# Patient Record
Sex: FEMALE | Race: WHITE | NOT HISPANIC OR LATINO | URBAN - METROPOLITAN AREA
[De-identification: names, ages, dates, MRNs, and addresses within clinical notes are randomized per-mention and may not be internally consistent; named-entity substitution may affect disease eponyms.]

---

## 2021-02-23 ENCOUNTER — EMERGENCY (EMERGENCY)
Facility: HOSPITAL | Age: 26
LOS: 1 days | Discharge: HOME | End: 2021-02-23
Attending: EMERGENCY MEDICINE | Admitting: EMERGENCY MEDICINE
Payer: COMMERCIAL

## 2021-02-23 VITALS
DIASTOLIC BLOOD PRESSURE: 78 MMHG | TEMPERATURE: 98 F | HEART RATE: 77 BPM | OXYGEN SATURATION: 97 % | SYSTOLIC BLOOD PRESSURE: 117 MMHG | RESPIRATION RATE: 18 BRPM

## 2021-02-23 DIAGNOSIS — Z20.822 CONTACT WITH AND (SUSPECTED) EXPOSURE TO COVID-19: ICD-10-CM

## 2021-02-23 PROCEDURE — 99282 EMERGENCY DEPT VISIT SF MDM: CPT

## 2021-02-23 NOTE — ED PROVIDER NOTE - OBJECTIVE STATEMENT
26 y/o F presents to the ED requesting to have testing done for COVID-19. Pt endorses she is asymptomatic at this time. Pt denies fevers, chills, coughs, CP, and SOB.

## 2021-02-23 NOTE — ED PROVIDER NOTE - PATIENT PORTAL LINK FT
You can access the FollowMyHealth Patient Portal offered by [facility] by registering at the following website: [email or web address]. By joining [employer]’s FollowMyHealth portal, you will also be able to view your health information using other applications (apps) compatible with our system.

## 2021-02-24 LAB — SARS-COV-2 RNA SPEC QL NAA+PROBE: SIGNIFICANT CHANGE UP

## 2023-09-17 ENCOUNTER — EMERGENCY (EMERGENCY)
Facility: HOSPITAL | Age: 28
LOS: 1 days | Discharge: HOME | End: 2023-09-17
Admitting: EMERGENCY MEDICINE
Payer: COMMERCIAL

## 2023-09-17 VITALS
TEMPERATURE: 98 F | WEIGHT: 138.89 LBS | OXYGEN SATURATION: 98 % | RESPIRATION RATE: 18 BRPM | HEART RATE: 50 BPM | DIASTOLIC BLOOD PRESSURE: 79 MMHG | SYSTOLIC BLOOD PRESSURE: 118 MMHG

## 2023-09-17 VITALS
DIASTOLIC BLOOD PRESSURE: 71 MMHG | TEMPERATURE: 98 F | RESPIRATION RATE: 17 BRPM | OXYGEN SATURATION: 99 % | SYSTOLIC BLOOD PRESSURE: 116 MMHG | HEART RATE: 67 BPM

## 2023-09-17 LAB
ALBUMIN SERPL ELPH-MCNC: 4.1 G/DL — SIGNIFICANT CHANGE UP (ref 3.4–5)
ALP SERPL-CCNC: 49 U/L — SIGNIFICANT CHANGE UP (ref 40–120)
ALT FLD-CCNC: 21 U/L — SIGNIFICANT CHANGE UP (ref 12–42)
ANION GAP SERPL CALC-SCNC: 6 MMOL/L — LOW (ref 9–16)
APPEARANCE UR: ABNORMAL
AST SERPL-CCNC: 17 U/L — SIGNIFICANT CHANGE UP (ref 15–37)
BILIRUB SERPL-MCNC: 0.5 MG/DL — SIGNIFICANT CHANGE UP (ref 0.2–1.2)
BILIRUB UR-MCNC: NEGATIVE — SIGNIFICANT CHANGE UP
BUN SERPL-MCNC: 11 MG/DL — SIGNIFICANT CHANGE UP (ref 7–23)
CALCIUM SERPL-MCNC: 9 MG/DL — SIGNIFICANT CHANGE UP (ref 8.5–10.5)
CHLORIDE SERPL-SCNC: 101 MMOL/L — SIGNIFICANT CHANGE UP (ref 96–108)
CO2 SERPL-SCNC: 28 MMOL/L — SIGNIFICANT CHANGE UP (ref 22–31)
COLOR SPEC: YELLOW — SIGNIFICANT CHANGE UP
COMMENT - URINE: SIGNIFICANT CHANGE UP
CREAT SERPL-MCNC: 0.79 MG/DL — SIGNIFICANT CHANGE UP (ref 0.5–1.3)
DIFF PNL FLD: NEGATIVE — SIGNIFICANT CHANGE UP
EGFR: 106 ML/MIN/1.73M2 — SIGNIFICANT CHANGE UP
GLUCOSE SERPL-MCNC: 120 MG/DL — HIGH (ref 70–99)
GLUCOSE UR QL: NEGATIVE MG/DL — SIGNIFICANT CHANGE UP
HCG UR QL: NEGATIVE — SIGNIFICANT CHANGE UP
HCT VFR BLD CALC: 41.5 % — SIGNIFICANT CHANGE UP (ref 34.5–45)
HGB BLD-MCNC: 14 G/DL — SIGNIFICANT CHANGE UP (ref 11.5–15.5)
KETONES UR-MCNC: 40 MG/DL
LACTATE BLDV-MCNC: 1 MMOL/L — SIGNIFICANT CHANGE UP (ref 0.5–2)
LEUKOCYTE ESTERASE UR-ACNC: ABNORMAL
LIDOCAIN IGE QN: 43 U/L — SIGNIFICANT CHANGE UP (ref 16–77)
MAGNESIUM SERPL-MCNC: 1.9 MG/DL — SIGNIFICANT CHANGE UP (ref 1.6–2.6)
MCHC RBC-ENTMCNC: 31.2 PG — SIGNIFICANT CHANGE UP (ref 27–34)
MCHC RBC-ENTMCNC: 33.7 GM/DL — SIGNIFICANT CHANGE UP (ref 32–36)
MCV RBC AUTO: 92.4 FL — SIGNIFICANT CHANGE UP (ref 80–100)
NITRITE UR-MCNC: NEGATIVE — SIGNIFICANT CHANGE UP
NRBC # BLD: 0 /100 WBCS — SIGNIFICANT CHANGE UP (ref 0–0)
PH UR: 8.5 (ref 5–8)
PHOSPHATE SERPL-MCNC: 3.8 MG/DL — SIGNIFICANT CHANGE UP (ref 2.5–4.5)
PLATELET # BLD AUTO: 221 K/UL — SIGNIFICANT CHANGE UP (ref 150–400)
POTASSIUM SERPL-MCNC: 4.1 MMOL/L — SIGNIFICANT CHANGE UP (ref 3.5–5.3)
POTASSIUM SERPL-SCNC: 4.1 MMOL/L — SIGNIFICANT CHANGE UP (ref 3.5–5.3)
PROT SERPL-MCNC: 7.5 G/DL — SIGNIFICANT CHANGE UP (ref 6.4–8.2)
PROT UR-MCNC: NEGATIVE MG/DL — SIGNIFICANT CHANGE UP
RBC # BLD: 4.49 M/UL — SIGNIFICANT CHANGE UP (ref 3.8–5.2)
RBC # FLD: 11.9 % — SIGNIFICANT CHANGE UP (ref 10.3–14.5)
RBC CASTS # UR COMP ASSIST: 0 /HPF — SIGNIFICANT CHANGE UP (ref 0–4)
SODIUM SERPL-SCNC: 135 MMOL/L — SIGNIFICANT CHANGE UP (ref 132–145)
SP GR SPEC: 1.02 — SIGNIFICANT CHANGE UP (ref 1–1.03)
UROBILINOGEN FLD QL: 1 MG/DL — SIGNIFICANT CHANGE UP (ref 0.2–1)
WBC # BLD: 7.97 K/UL — SIGNIFICANT CHANGE UP (ref 3.8–10.5)
WBC # FLD AUTO: 7.97 K/UL — SIGNIFICANT CHANGE UP (ref 3.8–10.5)
WBC UR QL: 0 /HPF — SIGNIFICANT CHANGE UP (ref 0–5)

## 2023-09-17 PROCEDURE — 76830 TRANSVAGINAL US NON-OB: CPT | Mod: 26

## 2023-09-17 PROCEDURE — 99284 EMERGENCY DEPT VISIT MOD MDM: CPT

## 2023-09-17 PROCEDURE — 76705 ECHO EXAM OF ABDOMEN: CPT | Mod: 26

## 2023-09-17 PROCEDURE — 76856 US EXAM PELVIC COMPLETE: CPT | Mod: 26,59

## 2023-09-17 RX ORDER — ACETAMINOPHEN 500 MG
650 TABLET ORAL ONCE
Refills: 0 | Status: COMPLETED | OUTPATIENT
Start: 2023-09-17 | End: 2023-09-17

## 2023-09-17 RX ORDER — SODIUM CHLORIDE 9 MG/ML
1000 INJECTION INTRAMUSCULAR; INTRAVENOUS; SUBCUTANEOUS ONCE
Refills: 0 | Status: COMPLETED | OUTPATIENT
Start: 2023-09-17 | End: 2023-09-17

## 2023-09-17 RX ORDER — FAMOTIDINE 10 MG/ML
20 INJECTION INTRAVENOUS ONCE
Refills: 0 | Status: DISCONTINUED | OUTPATIENT
Start: 2023-09-17 | End: 2023-09-17

## 2023-09-17 RX ORDER — ONDANSETRON 8 MG/1
4 TABLET, FILM COATED ORAL ONCE
Refills: 0 | Status: COMPLETED | OUTPATIENT
Start: 2023-09-17 | End: 2023-09-17

## 2023-09-17 RX ADMIN — ONDANSETRON 4 MILLIGRAM(S): 8 TABLET, FILM COATED ORAL at 15:29

## 2023-09-17 RX ADMIN — SODIUM CHLORIDE 1000 MILLILITER(S): 9 INJECTION INTRAMUSCULAR; INTRAVENOUS; SUBCUTANEOUS at 15:30

## 2023-09-17 NOTE — ED PROVIDER NOTE - OBJECTIVE STATEMENT
28-year-old female, no medical history, presents this emergency department for upper abdominal pain that started upon awakening this morning with nausea and vomiting.  Patient states that 10 days ago she was urinating when she smelled something funny.  States that she proceeded to get a tampon that was in the 14 days.  Patient then went to her GYN, urine was done which grew nothing and was tested for BV, yeast, and STDs which were all negative.  However patient was still placed on Flagyl.  States that she is on day 3 of her Flagyl now.  Denies any blood or bilious vomiting.  Last bowel movement was this morning.  Denies any surgical history.

## 2023-09-17 NOTE — ED PROVIDER NOTE - CLINICAL SUMMARY MEDICAL DECISION MAKING FREE TEXT BOX
Cannot rule out gallbladder vs pancreas abnormalities vs GERD   Cannot rule out appendicitis vs diverticulitis vs nephrolithiasis vs ovarian/testicular abnormalities   Denies hypotension, pulsatile mass, VSS, no sudden/severe pain - AAA unlikely  Denies hematuria, N/V, pt is well-appearing - Nephrolithiasis unlikely  Labs, lipase UA, UCG ordered  Fluids and pain relief ordered  Abd and pelvic U/S ordered  Will continue to monitor pt

## 2023-09-17 NOTE — ED ADULT NURSE NOTE - NSFALLUNIVINTERV_ED_ALL_ED
Bed/Stretcher in lowest position, wheels locked, appropriate side rails in place/Call bell, personal items and telephone in reach/Instruct patient to call for assistance before getting out of bed/chair/stretcher/Non-slip footwear applied when patient is off stretcher/[place] to call system/Physically safe environment - no spills, clutter or unnecessary equipment/Purposeful proactive rounding/Room/bathroom lighting operational, light cord in reach

## 2023-09-17 NOTE — ED PROVIDER NOTE - PATIENT PORTAL LINK FT
You can access the FollowMyHealth Patient Portal offered by [facility] by registering at the following website: [email or web address]. By joining [employer]’s FollowMyHealth portal, you will also be able to view your health information using other applications (apps) compatible with our system.

## 2023-09-17 NOTE — ED PROVIDER NOTE - NSFOLLOWUPINSTRUCTIONS_ED_ALL_ED_FT
Overview  A functional ovarian cyst is a fluid-filled sac that forms on the ovary. A sac normally forms during ovulation to hold a maturing egg. Usually the sac goes away after the egg is released. But if the egg is not released, or if the sac closes up after the egg is released, the sac can swell up with fluid. This forms a functional cyst.    These ovarian cysts are different than ovarian growths caused by other problems, such as cancer. Most functional ovarian cysts cause no symptoms and go away on their own. Some cause mild pain. Others can cause severe pain when they break or bleed.    Follow-up care is a key part of your treatment and safety. Be sure to make and go to all appointments, and call your doctor or nurse advice line ([phone] in most provinces and territories) if you are having problems. It's also a good idea to know your test results and keep a list of the medicines you take.    How can you care for yourself at home?  Use heat, such as a hot water bottle, a heating pad set on low, or a warm bath, to relax tense muscles and relieve cramping.  Be safe with medicines. Take pain medicines exactly as directed.  If the doctor gave you a prescription medicine for pain, take it as prescribed.  If you are not taking a prescription pain medicine, ask your doctor if you can take an over-the-counter medicine.  Avoid constipation. Make sure you drink enough fluids and include fruits, vegetables, and fibre in your diet each day. Constipation does not cause ovarian cysts, but it may make you feel more uncomfortable.  When should you call for help?  	  Call your doctor or nurse advice line now or seek immediate medical care if:    You have severe vaginal bleeding.  You have new or worse belly or pelvic pain.  Watch closely for changes in your health, and be sure to contact your doctor or nurse advice line if:    You have unusual vaginal bleeding.  You do not get better as expected.

## 2023-09-19 ENCOUNTER — TRANSCRIPTION ENCOUNTER (OUTPATIENT)
Age: 28
End: 2023-09-19

## 2023-09-19 ENCOUNTER — INPATIENT (INPATIENT)
Facility: HOSPITAL | Age: 28
LOS: 12 days | Discharge: HOME HEALTH | DRG: 853 | End: 2023-10-02
Attending: SURGERY | Admitting: SURGERY
Payer: COMMERCIAL

## 2023-09-19 VITALS
OXYGEN SATURATION: 98 % | WEIGHT: 138.01 LBS | HEIGHT: 69 IN | RESPIRATION RATE: 16 BRPM | TEMPERATURE: 99 F | HEART RATE: 73 BPM | DIASTOLIC BLOOD PRESSURE: 75 MMHG | SYSTOLIC BLOOD PRESSURE: 117 MMHG

## 2023-09-19 LAB
ALBUMIN SERPL ELPH-MCNC: 3.4 G/DL — SIGNIFICANT CHANGE UP (ref 3.4–5)
ALP SERPL-CCNC: 51 U/L — SIGNIFICANT CHANGE UP (ref 40–120)
ALT FLD-CCNC: 16 U/L — SIGNIFICANT CHANGE UP (ref 12–42)
ANION GAP SERPL CALC-SCNC: 8 MMOL/L — LOW (ref 9–16)
APTT BLD: 26.6 SEC — SIGNIFICANT CHANGE UP (ref 24.5–35.6)
AST SERPL-CCNC: 13 U/L — LOW (ref 15–37)
BASOPHILS # BLD AUTO: 0.04 K/UL — SIGNIFICANT CHANGE UP (ref 0–0.2)
BASOPHILS NFR BLD AUTO: 0.3 % — SIGNIFICANT CHANGE UP (ref 0–2)
BILIRUB SERPL-MCNC: 0.7 MG/DL — SIGNIFICANT CHANGE UP (ref 0.2–1.2)
BUN SERPL-MCNC: 10 MG/DL — SIGNIFICANT CHANGE UP (ref 7–23)
CALCIUM SERPL-MCNC: 8.7 MG/DL — SIGNIFICANT CHANGE UP (ref 8.5–10.5)
CHLORIDE SERPL-SCNC: 105 MMOL/L — SIGNIFICANT CHANGE UP (ref 96–108)
CO2 SERPL-SCNC: 27 MMOL/L — SIGNIFICANT CHANGE UP (ref 22–31)
CREAT SERPL-MCNC: 0.73 MG/DL — SIGNIFICANT CHANGE UP (ref 0.5–1.3)
EGFR: 116 ML/MIN/1.73M2 — SIGNIFICANT CHANGE UP
EOSINOPHIL # BLD AUTO: 0.02 K/UL — SIGNIFICANT CHANGE UP (ref 0–0.5)
EOSINOPHIL NFR BLD AUTO: 0.1 % — SIGNIFICANT CHANGE UP (ref 0–6)
GLUCOSE SERPL-MCNC: 116 MG/DL — HIGH (ref 70–99)
HCG SERPL-ACNC: <1 MIU/ML — SIGNIFICANT CHANGE UP
HCT VFR BLD CALC: 43.1 % — SIGNIFICANT CHANGE UP (ref 34.5–45)
HGB BLD-MCNC: 14.7 G/DL — SIGNIFICANT CHANGE UP (ref 11.5–15.5)
IMM GRANULOCYTES NFR BLD AUTO: 0.7 % — SIGNIFICANT CHANGE UP (ref 0–0.9)
INR BLD: 1.09 — SIGNIFICANT CHANGE UP (ref 0.85–1.18)
LIDOCAIN IGE QN: 26 U/L — SIGNIFICANT CHANGE UP (ref 16–77)
LYMPHOCYTES # BLD AUTO: 0.93 K/UL — LOW (ref 1–3.3)
LYMPHOCYTES # BLD AUTO: 6.3 % — LOW (ref 13–44)
MCHC RBC-ENTMCNC: 31.6 PG — SIGNIFICANT CHANGE UP (ref 27–34)
MCHC RBC-ENTMCNC: 34.1 GM/DL — SIGNIFICANT CHANGE UP (ref 32–36)
MCV RBC AUTO: 92.7 FL — SIGNIFICANT CHANGE UP (ref 80–100)
MONOCYTES # BLD AUTO: 1.15 K/UL — HIGH (ref 0–0.9)
MONOCYTES NFR BLD AUTO: 7.7 % — SIGNIFICANT CHANGE UP (ref 2–14)
NEUTROPHILS # BLD AUTO: 12.64 K/UL — HIGH (ref 1.8–7.4)
NEUTROPHILS NFR BLD AUTO: 84.9 % — HIGH (ref 43–77)
NRBC # BLD: 0 /100 WBCS — SIGNIFICANT CHANGE UP (ref 0–0)
PLATELET # BLD AUTO: 247 K/UL — SIGNIFICANT CHANGE UP (ref 150–400)
POTASSIUM SERPL-MCNC: 4.3 MMOL/L — SIGNIFICANT CHANGE UP (ref 3.5–5.3)
POTASSIUM SERPL-SCNC: 4.3 MMOL/L — SIGNIFICANT CHANGE UP (ref 3.5–5.3)
PROT SERPL-MCNC: 6.8 G/DL — SIGNIFICANT CHANGE UP (ref 6.4–8.2)
PROTHROM AB SERPL-ACNC: 12.3 SEC — SIGNIFICANT CHANGE UP (ref 9.5–13)
RBC # BLD: 4.65 M/UL — SIGNIFICANT CHANGE UP (ref 3.8–5.2)
RBC # FLD: 12.2 % — SIGNIFICANT CHANGE UP (ref 10.3–14.5)
SODIUM SERPL-SCNC: 140 MMOL/L — SIGNIFICANT CHANGE UP (ref 132–145)
WBC # BLD: 14.88 K/UL — HIGH (ref 3.8–10.5)
WBC # FLD AUTO: 14.88 K/UL — HIGH (ref 3.8–10.5)

## 2023-09-19 PROCEDURE — 74177 CT ABD & PELVIS W/CONTRAST: CPT | Mod: 26

## 2023-09-19 PROCEDURE — 99285 EMERGENCY DEPT VISIT HI MDM: CPT

## 2023-09-19 RX ORDER — HYDROMORPHONE HYDROCHLORIDE 2 MG/ML
0.5 INJECTION INTRAMUSCULAR; INTRAVENOUS; SUBCUTANEOUS ONCE
Refills: 0 | Status: DISCONTINUED | OUTPATIENT
Start: 2023-09-19 | End: 2023-09-19

## 2023-09-19 RX ORDER — METRONIDAZOLE 500 MG
500 TABLET ORAL ONCE
Refills: 0 | Status: COMPLETED | OUTPATIENT
Start: 2023-09-19 | End: 2023-09-19

## 2023-09-19 RX ORDER — CEFTRIAXONE 500 MG/1
1000 INJECTION, POWDER, FOR SOLUTION INTRAMUSCULAR; INTRAVENOUS ONCE
Refills: 0 | Status: COMPLETED | OUTPATIENT
Start: 2023-09-19 | End: 2023-09-19

## 2023-09-19 RX ORDER — MORPHINE SULFATE 50 MG/1
4 CAPSULE, EXTENDED RELEASE ORAL ONCE
Refills: 0 | Status: DISCONTINUED | OUTPATIENT
Start: 2023-09-19 | End: 2023-09-19

## 2023-09-19 RX ORDER — SODIUM CHLORIDE 9 MG/ML
1000 INJECTION INTRAMUSCULAR; INTRAVENOUS; SUBCUTANEOUS ONCE
Refills: 0 | Status: COMPLETED | OUTPATIENT
Start: 2023-09-19 | End: 2023-09-19

## 2023-09-19 RX ORDER — ONDANSETRON 8 MG/1
4 TABLET, FILM COATED ORAL ONCE
Refills: 0 | Status: COMPLETED | OUTPATIENT
Start: 2023-09-19 | End: 2023-09-19

## 2023-09-19 RX ORDER — HYDROMORPHONE HYDROCHLORIDE 2 MG/ML
1 INJECTION INTRAMUSCULAR; INTRAVENOUS; SUBCUTANEOUS ONCE
Refills: 0 | Status: DISCONTINUED | OUTPATIENT
Start: 2023-09-19 | End: 2023-09-19

## 2023-09-19 RX ADMIN — MORPHINE SULFATE 4 MILLIGRAM(S): 50 CAPSULE, EXTENDED RELEASE ORAL at 19:29

## 2023-09-19 RX ADMIN — SODIUM CHLORIDE 1000 MILLILITER(S): 9 INJECTION INTRAMUSCULAR; INTRAVENOUS; SUBCUTANEOUS at 19:29

## 2023-09-19 RX ADMIN — CEFTRIAXONE 100 MILLIGRAM(S): 500 INJECTION, POWDER, FOR SOLUTION INTRAMUSCULAR; INTRAVENOUS at 20:32

## 2023-09-19 RX ADMIN — Medication 100 MILLIGRAM(S): at 20:45

## 2023-09-19 RX ADMIN — MORPHINE SULFATE 4 MILLIGRAM(S): 50 CAPSULE, EXTENDED RELEASE ORAL at 23:18

## 2023-09-19 RX ADMIN — HYDROMORPHONE HYDROCHLORIDE 1 MILLIGRAM(S): 2 INJECTION INTRAMUSCULAR; INTRAVENOUS; SUBCUTANEOUS at 20:34

## 2023-09-19 RX ADMIN — ONDANSETRON 4 MILLIGRAM(S): 8 TABLET, FILM COATED ORAL at 19:29

## 2023-09-19 NOTE — ED PROVIDER NOTE - ATTENDING APP SHARED VISIT CONTRIBUTION OF CARE
I have seen the pt, reviewed all pertinent clinical data, and I agree with the documentation/care/plan executed by RODY Monsalve.

## 2023-09-19 NOTE — ED PROVIDER NOTE - OBJECTIVE STATEMENT
29 yo female seen 2 days ago for abdominal pain presents today for worsening abdominal pain mostly to right side of abdomen with assoc nausea/vomiting. no fever or diarrhea. no urinary symptoms. labs/US reviewed from 2 days ago -- 2cm right ovarian cyst with small amount of free fluid.

## 2023-09-19 NOTE — ED PROVIDER NOTE - NS ED ATTENDING STATEMENT MOD
This was a shared visit with the GORDY. I reviewed and verified the documentation and independently performed the documented:

## 2023-09-19 NOTE — ED PROVIDER NOTE - CLINICAL SUMMARY MEDICAL DECISION MAKING FREE TEXT BOX
+ peritoneal abdomen, + ruptured appendicitis w/appendicolith on CT, VSS/hemodynamically stable. Receiving IVF, pain control, IV abx in the ED, accepted to surgical service at [facility] for definitive management to regional bed.

## 2023-09-19 NOTE — ED ADULT NURSE NOTE - CHIEF COMPLAINT QUOTE
abdo pain seen here recently dx ovarian cyst right side, pain tonight worse, took 4 advil with tyelenol 40 mins, vocera pain upgrade, 1904, pt changed to cat 2 as not yet seen a provider and in pain

## 2023-09-19 NOTE — ED PROVIDER NOTE - PHYSICAL EXAMINATION
CONSTITUTIONAL: Well-appearing; well-nourished; in no apparent distress.   	HEAD: Normocephalic; atraumatic.   	EYES: clear  	ENT: normal nose; no rhinorrhea; normal pharynx with no erythema or lesions.   	NECK: Supple; non-tender;   	CARDIOVASCULAR: Normal S1, S2; no murmurs, rubs, or gallops. Regular rate and rhythm.   	RESPIRATORY: Breathing easily; breath sounds clear and equal bilaterally; no wheezes, rhonchi, or rales.  	GI: Soft; non-distended; +right lower abdominal tenderness to palpation with voluntary guarding. no rebound. no cvat bilaterally.   	EXT: CANTU x 4. normal gait.   	SKIN: Normal for age and race; warm; dry; good turgor; no apparent lesions or rash.   	NEURO: A & O x 3; face symmetric; grossly unremarkable.   PSYCHOLOGICAL: The patient’s mood and manner are appropriate.

## 2023-09-19 NOTE — ED ADULT TRIAGE NOTE - CHIEF COMPLAINT QUOTE
abdo pain seen here recently dx ovarian cyst right side, pain tonight worse, took 4 advil with tyelenol 40 mins abdo pain seen here recently dx ovarian cyst right side, pain tonight worse, took 4 advil with tyelenol 40 mins, vocera pain upgrade abdo pain seen here recently dx ovarian cyst right side, pain tonight worse, took 4 advil with tyelenol 40 mins, vocera pain upgrade, 1904, pt changed to cat 2 as not yet seen a provider and in pain

## 2023-09-20 ENCOUNTER — TRANSCRIPTION ENCOUNTER (OUTPATIENT)
Age: 28
End: 2023-09-20

## 2023-09-20 DIAGNOSIS — Z98.890 OTHER SPECIFIED POSTPROCEDURAL STATES: Chronic | ICD-10-CM

## 2023-09-20 LAB
ANION GAP SERPL CALC-SCNC: 10 MMOL/L — SIGNIFICANT CHANGE UP (ref 5–17)
BLD GP AB SCN SERPL QL: NEGATIVE — SIGNIFICANT CHANGE UP
BLD GP AB SCN SERPL QL: NEGATIVE — SIGNIFICANT CHANGE UP
BUN SERPL-MCNC: 6 MG/DL — LOW (ref 7–23)
CALCIUM SERPL-MCNC: 8.2 MG/DL — LOW (ref 8.4–10.5)
CHLORIDE SERPL-SCNC: 106 MMOL/L — SIGNIFICANT CHANGE UP (ref 96–108)
CO2 SERPL-SCNC: 24 MMOL/L — SIGNIFICANT CHANGE UP (ref 22–31)
CREAT SERPL-MCNC: 0.81 MG/DL — SIGNIFICANT CHANGE UP (ref 0.5–1.3)
EGFR: 103 ML/MIN/1.73M2 — SIGNIFICANT CHANGE UP
GLUCOSE SERPL-MCNC: 111 MG/DL — HIGH (ref 70–99)
GRAM STN FLD: SIGNIFICANT CHANGE UP
HCT VFR BLD CALC: 38.7 % — SIGNIFICANT CHANGE UP (ref 34.5–45)
HGB BLD-MCNC: 13 G/DL — SIGNIFICANT CHANGE UP (ref 11.5–15.5)
MAGNESIUM SERPL-MCNC: 1.4 MG/DL — LOW (ref 1.6–2.6)
MCHC RBC-ENTMCNC: 31 PG — SIGNIFICANT CHANGE UP (ref 27–34)
MCHC RBC-ENTMCNC: 33.6 GM/DL — SIGNIFICANT CHANGE UP (ref 32–36)
MCV RBC AUTO: 92.4 FL — SIGNIFICANT CHANGE UP (ref 80–100)
NRBC # BLD: 0 /100 WBCS — SIGNIFICANT CHANGE UP (ref 0–0)
PHOSPHATE SERPL-MCNC: 4.2 MG/DL — SIGNIFICANT CHANGE UP (ref 2.5–4.5)
PLATELET # BLD AUTO: 233 K/UL — SIGNIFICANT CHANGE UP (ref 150–400)
POTASSIUM SERPL-MCNC: 3.9 MMOL/L — SIGNIFICANT CHANGE UP (ref 3.5–5.3)
POTASSIUM SERPL-SCNC: 3.9 MMOL/L — SIGNIFICANT CHANGE UP (ref 3.5–5.3)
RBC # BLD: 4.19 M/UL — SIGNIFICANT CHANGE UP (ref 3.8–5.2)
RBC # FLD: 12.3 % — SIGNIFICANT CHANGE UP (ref 10.3–14.5)
RH IG SCN BLD-IMP: POSITIVE — SIGNIFICANT CHANGE UP
RH IG SCN BLD-IMP: POSITIVE — SIGNIFICANT CHANGE UP
SODIUM SERPL-SCNC: 140 MMOL/L — SIGNIFICANT CHANGE UP (ref 135–145)
SPECIMEN SOURCE: SIGNIFICANT CHANGE UP
WBC # BLD: 15.53 K/UL — HIGH (ref 3.8–10.5)
WBC # FLD AUTO: 15.53 K/UL — HIGH (ref 3.8–10.5)

## 2023-09-20 PROCEDURE — 71045 X-RAY EXAM CHEST 1 VIEW: CPT | Mod: 26

## 2023-09-20 PROCEDURE — 74018 RADEX ABDOMEN 1 VIEW: CPT | Mod: 26

## 2023-09-20 PROCEDURE — 99253 IP/OBS CNSLTJ NEW/EST LOW 45: CPT

## 2023-09-20 PROCEDURE — 88304 TISSUE EXAM BY PATHOLOGIST: CPT | Mod: 26

## 2023-09-20 PROCEDURE — 99231 SBSQ HOSP IP/OBS SF/LOW 25: CPT

## 2023-09-20 DEVICE — STAPLER COVIDIEN TRI-STAPLE 45MM PURPLE RELOAD: Type: IMPLANTABLE DEVICE | Status: FUNCTIONAL

## 2023-09-20 RX ORDER — MAGNESIUM SULFATE 500 MG/ML
2 VIAL (ML) INJECTION EVERY 6 HOURS
Refills: 0 | Status: DISCONTINUED | OUTPATIENT
Start: 2023-09-20 | End: 2023-09-23

## 2023-09-20 RX ORDER — HYDROMORPHONE HYDROCHLORIDE 2 MG/ML
0.25 INJECTION INTRAMUSCULAR; INTRAVENOUS; SUBCUTANEOUS
Refills: 0 | Status: DISCONTINUED | OUTPATIENT
Start: 2023-09-20 | End: 2023-09-22

## 2023-09-20 RX ORDER — HYDROMORPHONE HYDROCHLORIDE 2 MG/ML
0.25 INJECTION INTRAMUSCULAR; INTRAVENOUS; SUBCUTANEOUS EVERY 6 HOURS
Refills: 0 | Status: DISCONTINUED | OUTPATIENT
Start: 2023-09-20 | End: 2023-09-20

## 2023-09-20 RX ORDER — INFLUENZA VIRUS VACCINE 15; 15; 15; 15 UG/.5ML; UG/.5ML; UG/.5ML; UG/.5ML
0.5 SUSPENSION INTRAMUSCULAR ONCE
Refills: 0 | Status: DISCONTINUED | OUTPATIENT
Start: 2023-09-20 | End: 2023-10-02

## 2023-09-20 RX ORDER — PANTOPRAZOLE SODIUM 20 MG/1
40 TABLET, DELAYED RELEASE ORAL DAILY
Refills: 0 | Status: DISCONTINUED | OUTPATIENT
Start: 2023-09-20 | End: 2023-09-22

## 2023-09-20 RX ORDER — METRONIDAZOLE 500 MG
500 TABLET ORAL EVERY 8 HOURS
Refills: 0 | Status: DISCONTINUED | OUTPATIENT
Start: 2023-09-20 | End: 2023-09-22

## 2023-09-20 RX ORDER — CEFTRIAXONE 500 MG/1
1000 INJECTION, POWDER, FOR SOLUTION INTRAMUSCULAR; INTRAVENOUS EVERY 24 HOURS
Refills: 0 | Status: DISCONTINUED | OUTPATIENT
Start: 2023-09-20 | End: 2023-09-22

## 2023-09-20 RX ORDER — OXYCODONE HYDROCHLORIDE 5 MG/1
5 TABLET ORAL EVERY 6 HOURS
Refills: 0 | Status: DISCONTINUED | OUTPATIENT
Start: 2023-09-20 | End: 2023-09-20

## 2023-09-20 RX ORDER — HYDROMORPHONE HYDROCHLORIDE 2 MG/ML
0.25 INJECTION INTRAMUSCULAR; INTRAVENOUS; SUBCUTANEOUS EVERY 4 HOURS
Refills: 0 | Status: DISCONTINUED | OUTPATIENT
Start: 2023-09-20 | End: 2023-09-27

## 2023-09-20 RX ORDER — METRONIDAZOLE 500 MG
500 TABLET ORAL EVERY 8 HOURS
Refills: 0 | Status: DISCONTINUED | OUTPATIENT
Start: 2023-09-20 | End: 2023-09-20

## 2023-09-20 RX ORDER — HYDROMORPHONE HYDROCHLORIDE 2 MG/ML
0.5 INJECTION INTRAMUSCULAR; INTRAVENOUS; SUBCUTANEOUS EVERY 6 HOURS
Refills: 0 | Status: DISCONTINUED | OUTPATIENT
Start: 2023-09-20 | End: 2023-09-20

## 2023-09-20 RX ORDER — SODIUM CHLORIDE 9 MG/ML
1000 INJECTION, SOLUTION INTRAVENOUS
Refills: 0 | Status: DISCONTINUED | OUTPATIENT
Start: 2023-09-20 | End: 2023-09-20

## 2023-09-20 RX ORDER — SODIUM CHLORIDE 9 MG/ML
1000 INJECTION, SOLUTION INTRAVENOUS
Refills: 0 | Status: DISCONTINUED | OUTPATIENT
Start: 2023-09-20 | End: 2023-09-22

## 2023-09-20 RX ORDER — KETOROLAC TROMETHAMINE 30 MG/ML
15 SYRINGE (ML) INJECTION EVERY 6 HOURS
Refills: 0 | Status: DISCONTINUED | OUTPATIENT
Start: 2023-09-20 | End: 2023-09-20

## 2023-09-20 RX ORDER — ACETAMINOPHEN 500 MG
1000 TABLET ORAL ONCE
Refills: 0 | Status: COMPLETED | OUTPATIENT
Start: 2023-09-20 | End: 2023-09-20

## 2023-09-20 RX ORDER — ACETAMINOPHEN 500 MG
1000 TABLET ORAL ONCE
Refills: 0 | Status: COMPLETED | OUTPATIENT
Start: 2023-09-21 | End: 2023-09-21

## 2023-09-20 RX ORDER — CEFTRIAXONE 500 MG/1
1000 INJECTION, POWDER, FOR SOLUTION INTRAMUSCULAR; INTRAVENOUS EVERY 24 HOURS
Refills: 0 | Status: CANCELLED | OUTPATIENT
Start: 2023-09-21 | End: 2023-09-20

## 2023-09-20 RX ORDER — HEPARIN SODIUM 5000 [USP'U]/ML
5000 INJECTION INTRAVENOUS; SUBCUTANEOUS EVERY 8 HOURS
Refills: 0 | Status: DISCONTINUED | OUTPATIENT
Start: 2023-09-20 | End: 2023-09-26

## 2023-09-20 RX ORDER — LISDEXAMFETAMINE DIMESYLATE 70 MG/1
1 CAPSULE ORAL
Refills: 0 | DISCHARGE

## 2023-09-20 RX ORDER — BENZOCAINE 10 %
1 GEL (GRAM) MUCOUS MEMBRANE THREE TIMES A DAY
Refills: 0 | Status: DISCONTINUED | OUTPATIENT
Start: 2023-09-20 | End: 2023-10-01

## 2023-09-20 RX ORDER — OXYCODONE HYDROCHLORIDE 5 MG/1
10 TABLET ORAL EVERY 6 HOURS
Refills: 0 | Status: DISCONTINUED | OUTPATIENT
Start: 2023-09-20 | End: 2023-09-20

## 2023-09-20 RX ORDER — HYDROMORPHONE HYDROCHLORIDE 2 MG/ML
0.5 INJECTION INTRAMUSCULAR; INTRAVENOUS; SUBCUTANEOUS EVERY 4 HOURS
Refills: 0 | Status: DISCONTINUED | OUTPATIENT
Start: 2023-09-20 | End: 2023-09-27

## 2023-09-20 RX ORDER — KETOROLAC TROMETHAMINE 30 MG/ML
15 SYRINGE (ML) INJECTION EVERY 6 HOURS
Refills: 0 | Status: DISCONTINUED | OUTPATIENT
Start: 2023-09-20 | End: 2023-09-23

## 2023-09-20 RX ADMIN — HYDROMORPHONE HYDROCHLORIDE 0.5 MILLIGRAM(S): 2 INJECTION INTRAMUSCULAR; INTRAVENOUS; SUBCUTANEOUS at 08:31

## 2023-09-20 RX ADMIN — HYDROMORPHONE HYDROCHLORIDE 0.25 MILLIGRAM(S): 2 INJECTION INTRAMUSCULAR; INTRAVENOUS; SUBCUTANEOUS at 11:58

## 2023-09-20 RX ADMIN — CEFTRIAXONE 100 MILLIGRAM(S): 500 INJECTION, POWDER, FOR SOLUTION INTRAMUSCULAR; INTRAVENOUS at 22:26

## 2023-09-20 RX ADMIN — SODIUM CHLORIDE 100 MILLILITER(S): 9 INJECTION, SOLUTION INTRAVENOUS at 02:07

## 2023-09-20 RX ADMIN — Medication 400 MILLIGRAM(S): at 17:00

## 2023-09-20 RX ADMIN — Medication 1 SPRAY(S): at 17:48

## 2023-09-20 RX ADMIN — Medication 15 MILLIGRAM(S): at 21:07

## 2023-09-20 RX ADMIN — Medication 1 SPRAY(S): at 22:26

## 2023-09-20 RX ADMIN — Medication 100 MILLIGRAM(S): at 22:56

## 2023-09-20 RX ADMIN — Medication 400 MILLIGRAM(S): at 02:07

## 2023-09-20 RX ADMIN — HYDROMORPHONE HYDROCHLORIDE 0.5 MILLIGRAM(S): 2 INJECTION INTRAMUSCULAR; INTRAVENOUS; SUBCUTANEOUS at 02:25

## 2023-09-20 RX ADMIN — Medication 25 GRAM(S): at 07:42

## 2023-09-20 RX ADMIN — Medication 100 MILLIGRAM(S): at 04:57

## 2023-09-20 RX ADMIN — HEPARIN SODIUM 5000 UNIT(S): 5000 INJECTION INTRAVENOUS; SUBCUTANEOUS at 21:08

## 2023-09-20 RX ADMIN — HYDROMORPHONE HYDROCHLORIDE 0.5 MILLIGRAM(S): 2 INJECTION INTRAMUSCULAR; INTRAVENOUS; SUBCUTANEOUS at 03:18

## 2023-09-20 RX ADMIN — Medication 400 MILLIGRAM(S): at 23:59

## 2023-09-20 NOTE — H&P ADULT - NSHPPHYSICALEXAM_GEN_ALL_CORE
T(C): 38.5 (09-20-23 @ 01:51), Max: 38.5 (09-20-23 @ 01:51)  HR: 94 (09-20-23 @ 01:51) (73 - 95)  BP: 116/77 (09-20-23 @ 01:51) (102/67 - 117/75)  RR: 18 (09-20-23 @ 01:51) (16 - 18)  SpO2: 100% (09-20-23 @ 01:51) (98% - 100%)    CONSTITUTIONAL: Well groomed,   EYES: PERRLA and symmetric, EOMI,   ENMT: Oral mucosa with moist membranes.   RESP: No respiratory distress, no use of accessory muscles;   CV: RRR, +S1S2,   GI: firm, diffuse tenderness to palpation more tender along right quadrant, most tender on lower right quadrant   SKIN: No rashes or ulcers noted;  NEURO: CN II-XII intact  PSYCH: Appropriate insight/judgment; A+O x 3, mood and affect appropriate, recent/remote memory intact

## 2023-09-20 NOTE — PRE-ANESTHESIA EVALUATION ADULT - NSANTHOSAYNRD_GEN_A_CORE
No. ANAID screening performed.  STOP BANG Legend: 0-2 = LOW Risk; 3-4 = INTERMEDIATE Risk; 5-8 = HIGH Risk

## 2023-09-20 NOTE — CONSULT NOTE ADULT - SUBJECTIVE AND OBJECTIVE BOX
HPI:  29 yo female who PMH of ADHD, presenting for worsening right sided abdominal pain since Sunday. Patient reports she went to [facility] on sunday because of right sided abdominal pain which was not fully relieved by Advil and Tylenol. After receiving a pelvic US, she was told she had a right ovarian cyst and was discharged home with instruction to manage her pain. Patient notes the pain increased and was worsened with movement, prompting her to come back to the ED. She reports intermittent nausea, with 3 episodes of non bloody emesis since sunday. She notes having decreased appetite. She reports her last BM was today. She notes her last flatus was in the morning. She denies any chest pain, shortness of breath, fevers, or chills.   (20 Sep 2023 03:03)    Patient is a 28y old  Female who presents with a chief complaint of Acute appendicitis (20 Sep 2023 07:27)    SUBJECTIVE:  Patient was seen and examined at bedside. Feeling dizzy, has persistent abdominal pain, no N.V, no SOB, no chest pain. No other complaints or events reported    Review of systems: No fever, chills, dizziness, HA, Changes in vision, CP, dyspnea, nausea or vomiting, dysuria, LE edema. Rest of 12 point Review of systems negative unless otherwise documented elsewhere in note.         MEDICATIONS:  MEDICATIONS  (STANDING):  influenza   Vaccine 0.5 milliLiter(s) IntraMuscular once  magnesium sulfate  IVPB 2 Gram(s) IV Intermittent every 6 hours    MEDICATIONS  (PRN):      Allergies    No Known Allergies    Intolerances        OBJECTIVE:  Vital Signs Last 24 Hrs  T(C): 37.2 (20 Sep 2023 12:03), Max: 38.5 (20 Sep 2023 01:51)  T(F): 98.9 (20 Sep 2023 10:16), Max: 101.3 (20 Sep 2023 01:51)  HR: 94 (20 Sep 2023 12:03) (73 - 95)  BP: 112/77 (20 Sep 2023 12:03) (102/67 - 117/75)  BP(mean): 88 (20 Sep 2023 12:03) (88 - 88)  RR: 17 (20 Sep 2023 12:03) (16 - 18)  SpO2: 99% (20 Sep 2023 12:03) (97% - 100%)    Parameters below as of 20 Sep 2023 09:10  Patient On (Oxygen Delivery Method): room air      I&O's Summary    19 Sep 2023 07:01  -  20 Sep 2023 07:00  --------------------------------------------------------  IN: 800 mL / OUT: 600 mL / NET: 200 mL        PHYSICAL EXAM:  General: AOX3, in mild distress, lying in bed, no labored breathing on RA, speaking in full sentences   HEENT: AT/NC, no facial asymmetry  Lungs: poor inspiration, no crackles, no wheezes  Heart: RRR  Abdomen:+ tenderness  Extremities: warm, no edema, no tenderness, no focal deficit     LABS:                        13.0   15.53 )-----------( 233      ( 20 Sep 2023 05:53 )             38.7     09-20    140  |  106  |  6<L>  ----------------------------<  111<H>  3.9   |  24  |  0.81    Ca    8.2<L>      20 Sep 2023 05:53  Phos  4.2     09-20  Mg     1.4     09-20    TPro  6.8  /  Alb  3.4  /  TBili  0.7  /  DBili  x   /  AST  13<L>  /  ALT  16  /  AlkPhos  51  09-19    LIVER FUNCTIONS - ( 19 Sep 2023 19:25 )  Alb: 3.4 g/dL / Pro: 6.8 g/dL / ALK PHOS: 51 U/L / ALT: 16 U/L / AST: 13 U/L / GGT: x           PT/INR - ( 19 Sep 2023 19:25 )   PT: 12.3 sec;   INR: 1.09          PTT - ( 19 Sep 2023 19:25 )  PTT:26.6 sec  CAPILLARY BLOOD GLUCOSE        Urinalysis Basic - ( 20 Sep 2023 05:53 )    Color: x / Appearance: x / SG: x / pH: x  Gluc: 111 mg/dL / Ketone: x  / Bili: x / Urobili: x   Blood: x / Protein: x / Nitrite: x   Leuk Esterase: x / RBC: x / WBC x   Sq Epi: x / Non Sq Epi: x / Bacteria: x        MICRODATA:      RADIOLOGY/OTHER STUDIES:

## 2023-09-20 NOTE — BRIEF OPERATIVE NOTE - NSICDXBRIEFPOSTOP_GEN_ALL_CORE_FT
POST-OP DIAGNOSIS:  Perforated appendix 20-Sep-2023 14:29:33  Caryl Walters  Purulent peritonitis 20-Sep-2023 14:29:41  Caryl Walters

## 2023-09-20 NOTE — PROGRESS NOTE ADULT - SUBJECTIVE AND OBJECTIVE BOX
SUBJECTIVE: Patient seen and examined bedside by chief resident. still has some RLQ tenderness. understands plan for OR today     metroNIDAZOLE  IVPB 500 milliGRAM(s) IV Intermittent every 8 hours      Vital Signs Last 24 Hrs  T(C): 37 (20 Sep 2023 05:56), Max: 38.5 (20 Sep 2023 01:51)  T(F): 98.6 (20 Sep 2023 05:56), Max: 101.3 (20 Sep 2023 01:51)  HR: 76 (20 Sep 2023 05:56) (73 - 95)  BP: 105/68 (20 Sep 2023 05:56) (102/67 - 117/75)  BP(mean): --  RR: 18 (20 Sep 2023 05:56) (16 - 18)  SpO2: 97% (20 Sep 2023 05:56) (97% - 100%)    Parameters below as of 20 Sep 2023 05:56  Patient On (Oxygen Delivery Method): room air      I&O's Detail    19 Sep 2023 07:01  -  20 Sep 2023 07:00  --------------------------------------------------------  IN:    IV PiggyBack: 100 mL    IV PiggyBack: 100 mL    Lactated Ringers: 600 mL  Total IN: 800 mL    OUT:    Voided (mL): 600 mL  Total OUT: 600 mL    Total NET: 200 mL          General: NAD, resting comfortably in bed  C/V: NSR  Pulm: Nonlabored breathing, no respiratory distress  Abd: soft, ND, RLQ TTP  Extrem: WWP, no edema, SCDs in place        LABS:                        13.0   15.53 )-----------( 233      ( 20 Sep 2023 05:53 )             38.7     09-20    140  |  106  |  6<L>  ----------------------------<  111<H>  3.9   |  24  |  0.81    Ca    8.2<L>      20 Sep 2023 05:53  Phos  4.2     09-20  Mg     1.4     09-20    TPro  6.8  /  Alb  3.4  /  TBili  0.7  /  DBili  x   /  AST  13<L>  /  ALT  16  /  AlkPhos  51  09-19    PT/INR - ( 19 Sep 2023 19:25 )   PT: 12.3 sec;   INR: 1.09          PTT - ( 19 Sep 2023 19:25 )  PTT:26.6 sec  Urinalysis Basic - ( 20 Sep 2023 05:53 )    Color: x / Appearance: x / SG: x / pH: x  Gluc: 111 mg/dL / Ketone: x  / Bili: x / Urobili: x   Blood: x / Protein: x / Nitrite: x   Leuk Esterase: x / RBC: x / WBC x   Sq Epi: x / Non Sq Epi: x / Bacteria: x        RADIOLOGY & ADDITIONAL STUDIES:

## 2023-09-20 NOTE — PROGRESS NOTE ADULT - ASSESSMENT
28 year old female with PSH of left knee ACL repair presenting with worsening right abdomen pain for 3 days. ViWBC Count: 14.88, with a neutrophilic shift.  CT Abdomen and Pelvis w/ IV Cont showed Perforated appendicitis with appendicolith and evidence of peritonitis s/p lap appendectomy, partial cecectomy and washout    NPO/IVF  NGT, PPI  Pain and nausea control PRN  Ceft/flagyl  CECILIO  Lopes  SCDs/OOBA  AM labs

## 2023-09-20 NOTE — PROGRESS NOTE ADULT - ASSESSMENT
28 year old female with PSH of left knee ACL repair presenting with worsening right abdomen pain for 3 days. ViWBC Count: 14.88, with a neutrophilic shift.  CT Abdomen and Pelvis w/ IV Cont showed Perforated appendicitis with appendicolith and evidence of peritonitis. No evidence of drainable collection. Patient being admitted for OR tomorrow for perforated appendicitis.     OR today  NPO/IVF  Pain and nausea control PRN  Ceft/flagyl  SCDs/OOBA  AM labs

## 2023-09-20 NOTE — H&P ADULT - HISTORY OF PRESENT ILLNESS
27 yo female who PMH of ADHD, presenting for worsening right sided abdominal pain since Sunday. Patient reports she went to [facility] on sunday because of right sided abdominal pain which was not fully relieved by Advil and Tylenol. After receiving a pelvic US, she was told she had a right ovarian cyst and was discharged home with instruction to manage her pain. Patient notes the pain increased and was worsened with movement, prompting her to come back to the ED. She reports intemittent nausea, with 3 episodes of non bloody emesis since sunday. She notes having decreased appetite. She reports her last BM was today. She notes her last flatus was in the morning. She denies any chest pain, shortness of breath, fevers, or chills.

## 2023-09-20 NOTE — BRIEF OPERATIVE NOTE - OPERATION/FINDINGS
Appendix identified gangrenous and perforated with gross purulent peritonitis and interloop collections. Cecum bluntly mobilized. Mesoappendix divided using electrocautery. Appendix amputated at base near cecum using EndoGIA stapler 45 purple load. Stump inspected laparoscopically. Broke up interloop collections. Extensive intraperitoneal washout including pelvis and all four quadrants. LLQ jose david drain placed. Fascia closed w/ 0 Vicryl figure 8 x2. 4-0 monocryl skin closure.

## 2023-09-20 NOTE — PROGRESS NOTE ADULT - SUBJECTIVE AND OBJECTIVE BOX
POST-OPERATIVE NOTE    Procedure: lap appendectomy, partial cecectomy and washout    Diagnosis/Indication: perforated appendicitis    Surgeon:     S: Pt c/o abd discomfort and NG discomfort. Denies CP, SOB, calf tenderness. Pain controlled with medication. Denies nausea, vomiting.    O:  T(C): 36.4 (09-20-23 @ 16:29), Max: 36.4 (09-20-23 @ 16:29)  T(F): 97.6 (09-20-23 @ 16:29), Max: 97.6 (09-20-23 @ 16:29)  HR: 69 (09-20-23 @ 16:36) (64 - 81)  BP: 93/55 (09-20-23 @ 16:36) (88/54 - 94/54)  RR: 18 (09-20-23 @ 16:36) (13 - 20)  SpO2: 97% (09-20-23 @ 16:36) (95% - 99%)  Wt(kg): --                        13.0   15.53 )-----------( 233      ( 20 Sep 2023 05:53 )             38.7     09-20    140  |  106  |  6<L>  ----------------------------<  111<H>  3.9   |  24  |  0.81    Ca    8.2<L>      20 Sep 2023 05:53  Phos  4.2     09-20  Mg     1.4     09-20    TPro  6.8  /  Alb  3.4  /  TBili  0.7  /  DBili  x   /  AST  13<L>  /  ALT  16  /  AlkPhos  51  09-19      Gen: NAD, resting comfortably in bed  C/V: NSR  Pulm: Nonlabored breathing, no respiratory distress  Abd: appropriately but moderately distended, nontender, incisions CDI, CECILIO min SS output  Extrem: no calf tenderness, SCDs in place

## 2023-09-20 NOTE — BRIEF OPERATIVE NOTE - NSICDXBRIEFPROCEDURE_GEN_ALL_CORE_FT
PROCEDURES:  Laparoscopic appendectomy 20-Sep-2023 14:29:02  Caryl Walters  Laparoscopic partial cecectomy 20-Sep-2023 14:29:09  Caryl Walters

## 2023-09-20 NOTE — H&P ADULT - NSHPLABSRESULTS_GEN_ALL_CORE
< from: CT Abdomen and Pelvis w/ IV Cont (09.19.23 @ 19:49) >      BOWEL:. Appendix is fluid distended to 12 mm with a thickened enhancing   wall, which appears discontinuous. There is a 6 mm appendicolith at the   appendiceal origin and a 4 mm appendicolith distally. There is moderate   stranding of the adjacent mesenteric fat. There is mild fluid   infiltration throughout the pelvis and right lower quadrant with mild   fluid tracking throughout the peritoneal cavity. There is mild concentric   wall thickening and hyperenhancement of mildly patulous mid and distal   small bowel loops. There is a small incompletely peripherally enhancing   collection in the cul-de-sac and mild enhancement of the peritoneal   reflections. These findings are strongly suspicious for perforated   appendicitis with peritonitis. No discrete drainable collection is   identified.  VESSELS: Within normal limits.  RETROPERITONEUM/LYMPH NODES: No lymphadenopathy.  ABDOMINAL WALL: Within normal limits.  BONES: Within normal limits.    IMPRESSION: Perforated appendicitis with appendicolith and evidence of   peritonitis. No evidence of drainable collection. Results discussed with   Dr. James at time of interpretation.    < end of copied text >

## 2023-09-20 NOTE — H&P ADULT - ASSESSMENT
28 year old female with PSH of left knee ACL repair presenting with worsening right abdomen pain for 3 days. ViWBC Count: 14.88, with a neutrophilic shift.  CT Abdomen and Pelvis w/ IV Cont showed Perforated appendicitis with appendicolith and evidence of   peritonitis. No evidence of drainable collection. Patient being admitted for OR tomorrow for perforated appendicitis.         Plan  Npo/IVF  Pain and nausea control PRN  Ceft/flagyl  SCDs  Admit to regional, team 4

## 2023-09-20 NOTE — CONSULT NOTE ADULT - ASSESSMENT
27 y/o F with PMH of ADHD presents with abdominal pain in setting of sepsis secondary to appendicitis    Sepsis  Acute appendicitis  Tmax 101.3/HR 94, WBC 14.8. Imaging with perforated appendicitis  ATB per primary team on Ceftriaxone/Metronidazole, Plan for OR today  Pain management, IS, OOB    Hypomagnesemia  Replete    DVT ppx: per primary team  Discussed with primary team

## 2023-09-20 NOTE — CHART NOTE - NSCHARTNOTEFT_GEN_A_CORE
Pre-Op Dx: Perforated appendix  Procedure: Laparoscopic appendectomy    Laparoscopic partial cecectomy      Surgeon:     Subjective: Patient seen at bedside. Patient denies any shortness of breath, chest pain, nausea, pain, or vomiting. She does note some intermittent pain from the right lower quadrant.       Vital Signs Last 24 Hrs  T(C): 37.3 (20 Sep 2023 20:38), Max: 38.5 (20 Sep 2023 01:51)  T(F): 99.1 (20 Sep 2023 20:38), Max: 101.3 (20 Sep 2023 01:51)  HR: 77 (20 Sep 2023 20:38) (61 - 95)  BP: 96/61 (20 Sep 2023 20:38) (88/54 - 116/77)  BP(mean): 71 (20 Sep 2023 19:06) (63 - 88)  RR: 17 (20 Sep 2023 20:38) (13 - 20)  SpO2: 97% (20 Sep 2023 20:38) (95% - 100%)    Parameters below as of 20 Sep 2023 20:38  Patient On (Oxygen Delivery Method): room air        Physical Exam:  General: NAD, resting comfortably in bed  Pulmonary: Nonlabored breathing, no respiratory distress  Cardiovascular: NSR  Abdominal: tense, mod diffuse tenderness, Incision clean, dry, and intact. CECILIO drain on left quadrant with serous fluid, NG tube in place with bilious in tube but overall minimal output  Extremities: WWP, normal strength  Neuro: A/O x 3, CNs II-XII grossly intact, no focal deficits, normal sensation  Pulses: palpable distal pulses      LABS:                        13.0   15.53 )-----------( 233      ( 20 Sep 2023 05:53 )             38.7     09-20    140  |  106  |  6<L>  ----------------------------<  111<H>  3.9   |  24  |  0.81    Ca    8.2<L>      20 Sep 2023 05:53  Phos  4.2     09-20  Mg     1.4     09-20    TPro  6.8  /  Alb  3.4  /  TBili  0.7  /  DBili  x   /  AST  13<L>  /  ALT  16  /  AlkPhos  51  09-19    PT/INR - ( 19 Sep 2023 19:25 )   PT: 12.3 sec;   INR: 1.09          PTT - ( 19 Sep 2023 19:25 )  PTT:26.6 sec  CAPILLARY BLOOD GLUCOSE        Urinalysis Basic - ( 20 Sep 2023 05:53 )    Color: x / Appearance: x / SG: x / pH: x  Gluc: 111 mg/dL / Ketone: x  / Bili: x / Urobili: x   Blood: x / Protein: x / Nitrite: x   Leuk Esterase: x / RBC: x / WBC x   Sq Epi: x / Non Sq Epi: x / Bacteria: x      LIVER FUNCTIONS - ( 19 Sep 2023 19:25 )  Alb: 3.4 g/dL / Pro: 6.8 g/dL / ALK PHOS: 51 U/L / ALT: 16 U/L / AST: 13 U/L / GGT: x           ABO Interpretation: A (09-20 @ 06:30)        Radiology and Additional Studies:    Assessment:28 year old female with PSH of left knee ACL repair presenting with worsening right abdomen pain for 3 days. ViWBC Count: 14.88, with a neutrophilic shift.  CT Abdomen and Pelvis w/ IV Cont showed Perforated appendicitis with appendicolith and evidence of peritonitis s/p lap appendectomy, partial cecectomy and washout    NPO/IVF  NGT, PPI  Pain and nausea control PRN  Ceft/flagyl  CECILIO Lopes  SCDs/OOBA

## 2023-09-20 NOTE — PATIENT PROFILE ADULT - FALL HARM RISK - UNIVERSAL INTERVENTIONS
Bed in lowest position, wheels locked, appropriate side rails in place/Call bell, personal items and telephone in reach/Instruct patient to call for assistance before getting out of bed or chair/Non-slip footwear when patient is out of bed/[place] to call system/Physically safe environment - no spills, clutter or unnecessary equipment/Purposeful Proactive Rounding/Room/bathroom lighting operational, light cord in reach

## 2023-09-20 NOTE — PRE-OP CHECKLIST - SELECT TESTS ORDERED
BMP/CBC/Type and Screen/Urinalysis
CT Abdomen and Pelvis/BMP/CBC/PT/PTT/INR/Type and Screen/HCG/Results in MD note

## 2023-09-21 DIAGNOSIS — N83.11 CORPUS LUTEUM CYST OF RIGHT OVARY: ICD-10-CM

## 2023-09-21 DIAGNOSIS — R10.10 UPPER ABDOMINAL PAIN, UNSPECIFIED: ICD-10-CM

## 2023-09-21 LAB
ANION GAP SERPL CALC-SCNC: 9 MMOL/L — SIGNIFICANT CHANGE UP (ref 5–17)
BUN SERPL-MCNC: 9 MG/DL — SIGNIFICANT CHANGE UP (ref 7–23)
CALCIUM SERPL-MCNC: 8.9 MG/DL — SIGNIFICANT CHANGE UP (ref 8.4–10.5)
CHLORIDE SERPL-SCNC: 105 MMOL/L — SIGNIFICANT CHANGE UP (ref 96–108)
CO2 SERPL-SCNC: 24 MMOL/L — SIGNIFICANT CHANGE UP (ref 22–31)
CREAT SERPL-MCNC: 0.61 MG/DL — SIGNIFICANT CHANGE UP (ref 0.5–1.3)
EGFR: 126 ML/MIN/1.73M2 — SIGNIFICANT CHANGE UP
GLUCOSE SERPL-MCNC: 112 MG/DL — HIGH (ref 70–99)
HCT VFR BLD CALC: 36.1 % — SIGNIFICANT CHANGE UP (ref 34.5–45)
HGB BLD-MCNC: 11.9 G/DL — SIGNIFICANT CHANGE UP (ref 11.5–15.5)
MAGNESIUM SERPL-MCNC: 1.8 MG/DL — SIGNIFICANT CHANGE UP (ref 1.6–2.6)
MCHC RBC-ENTMCNC: 31.1 PG — SIGNIFICANT CHANGE UP (ref 27–34)
MCHC RBC-ENTMCNC: 33 GM/DL — SIGNIFICANT CHANGE UP (ref 32–36)
MCV RBC AUTO: 94.3 FL — SIGNIFICANT CHANGE UP (ref 80–100)
NRBC # BLD: 0 /100 WBCS — SIGNIFICANT CHANGE UP (ref 0–0)
PHOSPHATE SERPL-MCNC: 2.6 MG/DL — SIGNIFICANT CHANGE UP (ref 2.5–4.5)
PLATELET # BLD AUTO: 244 K/UL — SIGNIFICANT CHANGE UP (ref 150–400)
POTASSIUM SERPL-MCNC: 4.4 MMOL/L — SIGNIFICANT CHANGE UP (ref 3.5–5.3)
POTASSIUM SERPL-SCNC: 4.4 MMOL/L — SIGNIFICANT CHANGE UP (ref 3.5–5.3)
RBC # BLD: 3.83 M/UL — SIGNIFICANT CHANGE UP (ref 3.8–5.2)
RBC # FLD: 12.3 % — SIGNIFICANT CHANGE UP (ref 10.3–14.5)
SODIUM SERPL-SCNC: 138 MMOL/L — SIGNIFICANT CHANGE UP (ref 135–145)
WBC # BLD: 13.33 K/UL — HIGH (ref 3.8–10.5)
WBC # FLD AUTO: 13.33 K/UL — HIGH (ref 3.8–10.5)

## 2023-09-21 RX ORDER — MAGNESIUM SULFATE 500 MG/ML
1 VIAL (ML) INJECTION ONCE
Refills: 0 | Status: COMPLETED | OUTPATIENT
Start: 2023-09-21 | End: 2023-09-21

## 2023-09-21 RX ADMIN — Medication 15 MILLIGRAM(S): at 10:17

## 2023-09-21 RX ADMIN — Medication 100 MILLIGRAM(S): at 06:00

## 2023-09-21 RX ADMIN — Medication 100 MILLIGRAM(S): at 14:54

## 2023-09-21 RX ADMIN — Medication 400 MILLIGRAM(S): at 05:08

## 2023-09-21 RX ADMIN — PANTOPRAZOLE SODIUM 40 MILLIGRAM(S): 20 TABLET, DELAYED RELEASE ORAL at 11:05

## 2023-09-21 RX ADMIN — Medication 15 MILLIGRAM(S): at 04:34

## 2023-09-21 RX ADMIN — Medication 100 MILLIGRAM(S): at 22:47

## 2023-09-21 RX ADMIN — Medication 15 MILLIGRAM(S): at 16:47

## 2023-09-21 RX ADMIN — SODIUM CHLORIDE 100 MILLILITER(S): 9 INJECTION, SOLUTION INTRAVENOUS at 12:27

## 2023-09-21 RX ADMIN — Medication 400 MILLIGRAM(S): at 11:05

## 2023-09-21 RX ADMIN — HEPARIN SODIUM 5000 UNIT(S): 5000 INJECTION INTRAVENOUS; SUBCUTANEOUS at 14:54

## 2023-09-21 RX ADMIN — SODIUM CHLORIDE 100 MILLILITER(S): 9 INJECTION, SOLUTION INTRAVENOUS at 05:09

## 2023-09-21 RX ADMIN — HEPARIN SODIUM 5000 UNIT(S): 5000 INJECTION INTRAVENOUS; SUBCUTANEOUS at 05:08

## 2023-09-21 RX ADMIN — Medication 1 SPRAY(S): at 22:06

## 2023-09-21 RX ADMIN — HEPARIN SODIUM 5000 UNIT(S): 5000 INJECTION INTRAVENOUS; SUBCUTANEOUS at 22:07

## 2023-09-21 RX ADMIN — Medication 100 GRAM(S): at 08:39

## 2023-09-21 RX ADMIN — Medication 15 MILLIGRAM(S): at 22:07

## 2023-09-21 RX ADMIN — CEFTRIAXONE 100 MILLIGRAM(S): 500 INJECTION, POWDER, FOR SOLUTION INTRAMUSCULAR; INTRAVENOUS at 22:04

## 2023-09-21 NOTE — PROGRESS NOTE ADULT - ASSESSMENT
28 year old female with PSH of left knee ACL repair presenting with worsening right abdomen pain for 3 days. ViWBC Count: 14.88, with a neutrophilic shift.  CT Abdomen and Pelvis w/ IV Cont showed Perforated appendicitis with appendicolith and evidence of peritonitis s/p lap appendectomy, partial cecectomy and washout (9/20)    NPO/IVF  NGT, PPI  Pain and nausea control PRN  Ceft/flagyl  CECILIO  Lopes  SCDs/OOBA  AM labs

## 2023-09-21 NOTE — PROGRESS NOTE ADULT - SUBJECTIVE AND OBJECTIVE BOX
STATUS POST:  lap appendectomy, partial cecectomy and washout     SUBJECTIVE: Patient seen and examined bedside by chief resident. complains of some pain that is tolerable with pain medication. NGT is uncomfortable as well as montalvo catheter. denies flatus or bowel movements. denies nausea or vomiting     cefTRIAXone   IVPB 1000 milliGRAM(s) IV Intermittent every 24 hours  heparin   Injectable 5000 Unit(s) SubCutaneous every 8 hours  [id number]  IVPB 500 milliGRAM(s) IV Intermittent every 8 hours      Vital Signs Last 24 Hrs  T(C): 37.4 (21 Sep 2023 04:32), Max: 37.4 (21 Sep 2023 04:32)  T(F): 99.3 (21 Sep 2023 04:32), Max: 99.3 (21 Sep 2023 04:32)  HR: 81 (21 Sep 2023 04:32) (61 - 94)  BP: 99/63 (21 Sep 2023 04:32) (88/54 - 112/77)  BP(mean): 71 (20 Sep 2023 19:06) (63 - 88)  RR: 17 (21 Sep 2023 04:32) (13 - 20)  SpO2: 97% (21 Sep 2023 04:32) (95% - 99%)    Parameters below as of 21 Sep 2023 04:32  Patient On (Oxygen Delivery Method): room air      I&O's Detail    20 Sep 2023 07:01  -  21 Sep 2023 07:00  --------------------------------------------------------  IN:    IV PiggyBack: 100 mL    IV PiggyBack: 50 mL    IV PiggyBack: 200 mL    IV PiggyBack: 200 mL    Lactated Ringers: 1948 mL    Lactated Ringers: 1225 mL  Total IN: 3723 mL    OUT:    Blood Loss (mL): 10 mL    Bulb (mL): 325 mL    Indwelling Catheter - Urethral (mL): 1000 mL    Nasogastric/Oral tube (mL): 190 mL  Total OUT: 1525 mL    Total NET: 2198 mL          General: NAD, resting comfortably in bed, NGT   C/V: NSR  Pulm: Nonlabored breathing, no respiratory distress  Abd: soft, ND, tenderness around incisions, CECILIO with serosang fluid   Extrem: WWP, no edema, SCDs in place        LABS:                        13.0   15.53 )-----------( 233      ( 20 Sep 2023 05:53 )             38.7     09-20    140  |  106  |  6<L>  ----------------------------<  111<H>  3.9   |  24  |  0.81    Ca    8.2<L>      20 Sep 2023 05:53  Phos  4.2     09-20  Mg     1.4     09-20    TPro  6.8  /  Alb  3.4  /  TBili  0.7  /  DBili  x   /  AST  13<L>  /  ALT  16  /  AlkPhos  51  09-19    PT/INR - ( 19 Sep 2023 19:25 )   PT: 12.3 sec;   INR: 1.09          PTT - ( 19 Sep 2023 19:25 )  PTT:26.6 sec  Urinalysis Basic - ( 20 Sep 2023 05:53 )    Color: x / Appearance: x / SG: x / pH: x  Gluc: 111 mg/dL / Ketone: x  / Bili: x / Urobili: x   Blood: x / Protein: x / Nitrite: x   Leuk Esterase: x / RBC: x / WBC x   Sq Epi: x / Non Sq Epi: x / Bacteria: x        RADIOLOGY & ADDITIONAL STUDIES:

## 2023-09-22 LAB
-  AMIKACIN: SIGNIFICANT CHANGE UP
-  AMPICILLIN/SULBACTAM: SIGNIFICANT CHANGE UP
-  AMPICILLIN: SIGNIFICANT CHANGE UP
-  AZTREONAM: SIGNIFICANT CHANGE UP
-  CEFAZOLIN: SIGNIFICANT CHANGE UP
-  CEFEPIME: SIGNIFICANT CHANGE UP
-  CEFTRIAXONE: SIGNIFICANT CHANGE UP
-  CIPROFLOXACIN: SIGNIFICANT CHANGE UP
-  CIPROFLOXACIN: SIGNIFICANT CHANGE UP
-  ERTAPENEM: SIGNIFICANT CHANGE UP
-  GENTAMICIN: SIGNIFICANT CHANGE UP
-  LEVOFLOXACIN: SIGNIFICANT CHANGE UP
-  MEROPENEM: SIGNIFICANT CHANGE UP
-  PIPERACILLIN/TAZOBACTAM: SIGNIFICANT CHANGE UP
-  PIPERACILLIN/TAZOBACTAM: SIGNIFICANT CHANGE UP
-  TOBRAMYCIN: SIGNIFICANT CHANGE UP
-  TOBRAMYCIN: SIGNIFICANT CHANGE UP
-  TRIMETHOPRIM/SULFAMETHOXAZOLE: SIGNIFICANT CHANGE UP
ANION GAP SERPL CALC-SCNC: 8 MMOL/L — SIGNIFICANT CHANGE UP (ref 5–17)
BUN SERPL-MCNC: 14 MG/DL — SIGNIFICANT CHANGE UP (ref 7–23)
CALCIUM SERPL-MCNC: 8.2 MG/DL — LOW (ref 8.4–10.5)
CHLORIDE SERPL-SCNC: 108 MMOL/L — SIGNIFICANT CHANGE UP (ref 96–108)
CO2 SERPL-SCNC: 25 MMOL/L — SIGNIFICANT CHANGE UP (ref 22–31)
CREAT SERPL-MCNC: 0.61 MG/DL — SIGNIFICANT CHANGE UP (ref 0.5–1.3)
CULTURE RESULTS: SIGNIFICANT CHANGE UP
EGFR: 126 ML/MIN/1.73M2 — SIGNIFICANT CHANGE UP
GLUCOSE SERPL-MCNC: 87 MG/DL — SIGNIFICANT CHANGE UP (ref 70–99)
HCT VFR BLD CALC: 33.9 % — LOW (ref 34.5–45)
HGB BLD-MCNC: 11 G/DL — LOW (ref 11.5–15.5)
MAGNESIUM SERPL-MCNC: 1.8 MG/DL — SIGNIFICANT CHANGE UP (ref 1.6–2.6)
MCHC RBC-ENTMCNC: 31 PG — SIGNIFICANT CHANGE UP (ref 27–34)
MCHC RBC-ENTMCNC: 32.4 GM/DL — SIGNIFICANT CHANGE UP (ref 32–36)
MCV RBC AUTO: 95.5 FL — SIGNIFICANT CHANGE UP (ref 80–100)
METHOD TYPE: SIGNIFICANT CHANGE UP
NRBC # BLD: 0 /100 WBCS — SIGNIFICANT CHANGE UP (ref 0–0)
ORGANISM # SPEC MICROSCOPIC CNT: SIGNIFICANT CHANGE UP
PHOSPHATE SERPL-MCNC: 1.9 MG/DL — LOW (ref 2.5–4.5)
PLATELET # BLD AUTO: 233 K/UL — SIGNIFICANT CHANGE UP (ref 150–400)
POTASSIUM SERPL-MCNC: 3.8 MMOL/L — SIGNIFICANT CHANGE UP (ref 3.5–5.3)
POTASSIUM SERPL-SCNC: 3.8 MMOL/L — SIGNIFICANT CHANGE UP (ref 3.5–5.3)
RBC # BLD: 3.55 M/UL — LOW (ref 3.8–5.2)
RBC # FLD: 12.4 % — SIGNIFICANT CHANGE UP (ref 10.3–14.5)
SODIUM SERPL-SCNC: 141 MMOL/L — SIGNIFICANT CHANGE UP (ref 135–145)
SPECIMEN SOURCE: SIGNIFICANT CHANGE UP
WBC # BLD: 8.57 K/UL — SIGNIFICANT CHANGE UP (ref 3.8–10.5)
WBC # FLD AUTO: 8.57 K/UL — SIGNIFICANT CHANGE UP (ref 3.8–10.5)

## 2023-09-22 RX ORDER — PIPERACILLIN AND TAZOBACTAM 4; .5 G/20ML; G/20ML
3.38 INJECTION, POWDER, LYOPHILIZED, FOR SOLUTION INTRAVENOUS ONCE
Refills: 0 | Status: COMPLETED | OUTPATIENT
Start: 2023-09-22 | End: 2023-09-22

## 2023-09-22 RX ORDER — ACETAMINOPHEN 500 MG
650 TABLET ORAL EVERY 6 HOURS
Refills: 0 | Status: DISCONTINUED | OUTPATIENT
Start: 2023-09-22 | End: 2023-09-26

## 2023-09-22 RX ORDER — PIPERACILLIN AND TAZOBACTAM 4; .5 G/20ML; G/20ML
3.38 INJECTION, POWDER, LYOPHILIZED, FOR SOLUTION INTRAVENOUS ONCE
Refills: 0 | Status: COMPLETED | OUTPATIENT
Start: 2023-09-23 | End: 2023-09-23

## 2023-09-22 RX ORDER — PIPERACILLIN AND TAZOBACTAM 4; .5 G/20ML; G/20ML
3.38 INJECTION, POWDER, LYOPHILIZED, FOR SOLUTION INTRAVENOUS EVERY 8 HOURS
Refills: 0 | Status: DISCONTINUED | OUTPATIENT
Start: 2023-09-23 | End: 2023-09-24

## 2023-09-22 RX ORDER — ACETAMINOPHEN 500 MG
650 TABLET ORAL EVERY 6 HOURS
Refills: 0 | Status: DISCONTINUED | OUTPATIENT
Start: 2023-09-22 | End: 2023-09-22

## 2023-09-22 RX ADMIN — Medication 15 MILLIGRAM(S): at 22:00

## 2023-09-22 RX ADMIN — Medication 100 MILLIGRAM(S): at 07:02

## 2023-09-22 RX ADMIN — Medication 15 MILLIGRAM(S): at 16:18

## 2023-09-22 RX ADMIN — PIPERACILLIN AND TAZOBACTAM 25 GRAM(S): 4; .5 INJECTION, POWDER, LYOPHILIZED, FOR SOLUTION INTRAVENOUS at 17:23

## 2023-09-22 RX ADMIN — HEPARIN SODIUM 5000 UNIT(S): 5000 INJECTION INTRAVENOUS; SUBCUTANEOUS at 08:58

## 2023-09-22 RX ADMIN — HEPARIN SODIUM 5000 UNIT(S): 5000 INJECTION INTRAVENOUS; SUBCUTANEOUS at 16:18

## 2023-09-22 RX ADMIN — Medication 15 MILLIGRAM(S): at 09:06

## 2023-09-22 RX ADMIN — Medication 15 MILLIGRAM(S): at 03:54

## 2023-09-22 RX ADMIN — PIPERACILLIN AND TAZOBACTAM 200 GRAM(S): 4; .5 INJECTION, POWDER, LYOPHILIZED, FOR SOLUTION INTRAVENOUS at 13:43

## 2023-09-22 NOTE — PROGRESS NOTE ADULT - ASSESSMENT
28 year old female with PSH of left knee ACL repair presenting with worsening right abdomen pain for 3 days. ViWBC Count: 14.88, with a neutrophilic shift.  CT Abdomen and Pelvis w/ IV Cont showed Perforated appendicitis with appendicolith and evidence of peritonitis s/p lap appendectomy, partial cecectomy and washout (9/20), will d/c NGT, adv to CLD    CLD/IVF  Pain and nausea control PRN  Ceft/flagyl  CECILIO  SCDs/OOBA  AM labs

## 2023-09-22 NOTE — PROGRESS NOTE ADULT - SUBJECTIVE AND OBJECTIVE BOX
STATUS POST:  Laparoscopic appendectomy    Laparoscopic partial cecectomy    SUBJECTIVE: Pt seen and examined at the bedside by surgical team. Doing well overnight, +F/+BM, NGT d/isaac, adv CLD    Vital Signs Last 24 Hrs  T(C): 37.2 (22 Sep 2023 04:53), Max: 37.3 (21 Sep 2023 21:26)  T(F): 99 (22 Sep 2023 04:53), Max: 99.2 (21 Sep 2023 21:26)  HR: 74 (22 Sep 2023 04:53) (74 - 82)  BP: 108/67 (22 Sep 2023 04:53) (99/64 - 110/71)  BP(mean): 83 (21 Sep 2023 21:26) (83 - 83)  RR: 17 (22 Sep 2023 04:53) (17 - 18)  SpO2: 95% (22 Sep 2023 04:53) (95% - 100%)    Parameters below as of 22 Sep 2023 04:53  Patient On (Oxygen Delivery Method): room air        I&O's Summary    21 Sep 2023 07:01  -  22 Sep 2023 07:00  --------------------------------------------------------  IN: 2250 mL / OUT: 2060 mL / NET: 190 mL        Physical Exam:  General Appearance: Appears well, NAD  Pulmonary: Nonlabored breathing, no respiratory distress  Cardiovascular: NSR  Abdomen: Soft, nondisteded, appropriate incisional tenderness, dressings clean and dry and intact  Extremities: WWP, SCD's in place     LABS:                        11.9   13.33 )-----------( 244      ( 21 Sep 2023 07:32 )             36.1     09-21    138  |  105  |  9   ----------------------------<  112<H>  4.4   |  24  |  0.61    Ca    8.9      21 Sep 2023 07:32  Phos  2.6     09-21  Mg     1.8     09-21        Urinalysis Basic - ( 21 Sep 2023 07:32 )    Color: x / Appearance: x / SG: x / pH: x  Gluc: 112 mg/dL / Ketone: x  / Bili: x / Urobili: x   Blood: x / Protein: x / Nitrite: x   Leuk Esterase: x / RBC: x / WBC x   Sq Epi: x / Non Sq Epi: x / Bacteria: x

## 2023-09-23 LAB
ANION GAP SERPL CALC-SCNC: 7 MMOL/L — SIGNIFICANT CHANGE UP (ref 5–17)
BUN SERPL-MCNC: 13 MG/DL — SIGNIFICANT CHANGE UP (ref 7–23)
CALCIUM SERPL-MCNC: 8.3 MG/DL — LOW (ref 8.4–10.5)
CHLORIDE SERPL-SCNC: 106 MMOL/L — SIGNIFICANT CHANGE UP (ref 96–108)
CO2 SERPL-SCNC: 26 MMOL/L — SIGNIFICANT CHANGE UP (ref 22–31)
CREAT SERPL-MCNC: 0.62 MG/DL — SIGNIFICANT CHANGE UP (ref 0.5–1.3)
EGFR: 126 ML/MIN/1.73M2 — SIGNIFICANT CHANGE UP
GLUCOSE SERPL-MCNC: 100 MG/DL — HIGH (ref 70–99)
HCT VFR BLD CALC: 32.2 % — LOW (ref 34.5–45)
HGB BLD-MCNC: 10.6 G/DL — LOW (ref 11.5–15.5)
MAGNESIUM SERPL-MCNC: 1.7 MG/DL — SIGNIFICANT CHANGE UP (ref 1.6–2.6)
MCHC RBC-ENTMCNC: 31.4 PG — SIGNIFICANT CHANGE UP (ref 27–34)
MCHC RBC-ENTMCNC: 32.9 GM/DL — SIGNIFICANT CHANGE UP (ref 32–36)
MCV RBC AUTO: 95.3 FL — SIGNIFICANT CHANGE UP (ref 80–100)
NRBC # BLD: 0 /100 WBCS — SIGNIFICANT CHANGE UP (ref 0–0)
PHOSPHATE SERPL-MCNC: 3 MG/DL — SIGNIFICANT CHANGE UP (ref 2.5–4.5)
PLATELET # BLD AUTO: 251 K/UL — SIGNIFICANT CHANGE UP (ref 150–400)
POTASSIUM SERPL-MCNC: 3.6 MMOL/L — SIGNIFICANT CHANGE UP (ref 3.5–5.3)
POTASSIUM SERPL-SCNC: 3.6 MMOL/L — SIGNIFICANT CHANGE UP (ref 3.5–5.3)
RBC # BLD: 3.38 M/UL — LOW (ref 3.8–5.2)
RBC # FLD: 12.5 % — SIGNIFICANT CHANGE UP (ref 10.3–14.5)
SODIUM SERPL-SCNC: 139 MMOL/L — SIGNIFICANT CHANGE UP (ref 135–145)
WBC # BLD: 6.78 K/UL — SIGNIFICANT CHANGE UP (ref 3.8–10.5)
WBC # FLD AUTO: 6.78 K/UL — SIGNIFICANT CHANGE UP (ref 3.8–10.5)

## 2023-09-23 PROCEDURE — 99232 SBSQ HOSP IP/OBS MODERATE 35: CPT

## 2023-09-23 RX ORDER — LIDOCAINE 4 G/100G
1 CREAM TOPICAL DAILY
Refills: 0 | Status: DISCONTINUED | OUTPATIENT
Start: 2023-09-23 | End: 2023-09-25

## 2023-09-23 RX ORDER — POTASSIUM CHLORIDE 20 MEQ
20 PACKET (EA) ORAL ONCE
Refills: 0 | Status: COMPLETED | OUTPATIENT
Start: 2023-09-23 | End: 2023-09-23

## 2023-09-23 RX ADMIN — HEPARIN SODIUM 5000 UNIT(S): 5000 INJECTION INTRAVENOUS; SUBCUTANEOUS at 00:22

## 2023-09-23 RX ADMIN — HEPARIN SODIUM 5000 UNIT(S): 5000 INJECTION INTRAVENOUS; SUBCUTANEOUS at 07:36

## 2023-09-23 RX ADMIN — HEPARIN SODIUM 5000 UNIT(S): 5000 INJECTION INTRAVENOUS; SUBCUTANEOUS at 16:55

## 2023-09-23 RX ADMIN — PIPERACILLIN AND TAZOBACTAM 25 GRAM(S): 4; .5 INJECTION, POWDER, LYOPHILIZED, FOR SOLUTION INTRAVENOUS at 00:22

## 2023-09-23 RX ADMIN — Medication 20 MILLIEQUIVALENT(S): at 10:15

## 2023-09-23 RX ADMIN — PIPERACILLIN AND TAZOBACTAM 25 GRAM(S): 4; .5 INJECTION, POWDER, LYOPHILIZED, FOR SOLUTION INTRAVENOUS at 14:53

## 2023-09-23 RX ADMIN — Medication 15 MILLIGRAM(S): at 04:20

## 2023-09-23 RX ADMIN — Medication 15 MILLIGRAM(S): at 10:15

## 2023-09-23 RX ADMIN — PIPERACILLIN AND TAZOBACTAM 25 GRAM(S): 4; .5 INJECTION, POWDER, LYOPHILIZED, FOR SOLUTION INTRAVENOUS at 06:27

## 2023-09-23 RX ADMIN — PIPERACILLIN AND TAZOBACTAM 25 GRAM(S): 4; .5 INJECTION, POWDER, LYOPHILIZED, FOR SOLUTION INTRAVENOUS at 22:01

## 2023-09-23 RX ADMIN — Medication 15 MILLIGRAM(S): at 16:55

## 2023-09-23 NOTE — PROGRESS NOTE ADULT - ASSESSMENT
28 year old female with PSH of left knee ACL repair presenting with worsening right abdomen pain for 3 days. ViWBC Count: 14.88, with a neutrophilic shift.  CT Abdomen and Pelvis w/ IV Cont showed Perforated appendicitis with appendicolith and evidence of peritonitis s/p lap appendectomy, partial cecectomy and washout (9/20)    Low rest diet  Pain and nausea control PRN  CTX/flagyl  CECILIO  SCDs/OOBA  AM labs

## 2023-09-23 NOTE — PROGRESS NOTE ADULT - SUBJECTIVE AND OBJECTIVE BOX
SUBJECTIVE:  Patient was seen and examined at bedside. Sleeping, awakes to voice, no report of complaints.     Review of systems: limited, negative     Diet, Regular:   Fiber/Residue Restricted (LOWFIBER) (09-22-23 @ 16:10) [Active]      MEDICATIONS:  MEDICATIONS  (STANDING):  heparin   Injectable 5000 Unit(s) SubCutaneous every 8 hours  influenza   Vaccine 0.5 milliLiter(s) IntraMuscular once  ketorolac   Injectable 15 milliGRAM(s) IV Push every 6 hours  piperacillin/tazobactam IVPB.. 3.375 Gram(s) IV Intermittent every 8 hours  potassium chloride   Powder 20 milliEquivalent(s) Oral once    MEDICATIONS  (PRN):  acetaminophen     Tablet .. 650 milliGRAM(s) Oral every 6 hours PRN Mild Pain (1 - 3)  benzocaine 20% Spray 1 Spray(s) Topical three times a day PRN sore throat  HYDROmorphone  Injectable 0.5 milliGRAM(s) IV Push every 4 hours PRN Severe Pain (7 - 10)  HYDROmorphone  Injectable 0.25 milliGRAM(s) IV Push every 4 hours PRN Moderate Pain (4 - 6)      Allergies    No Known Allergies    Intolerances        OBJECTIVE:  Vital Signs Last 24 Hrs  T(C): 37.2 (23 Sep 2023 08:32), Max: 37.2 (23 Sep 2023 05:00)  T(F): 99 (23 Sep 2023 08:32), Max: 99 (23 Sep 2023 05:00)  HR: 71 (23 Sep 2023 08:32) (66 - 84)  BP: 111/75 (23 Sep 2023 08:32) (104/67 - 119/77)  BP(mean): --  RR: 17 (23 Sep 2023 08:32) (17 - 18)  SpO2: 96% (23 Sep 2023 08:32) (96% - 98%)    Parameters below as of 23 Sep 2023 08:32  Patient On (Oxygen Delivery Method): room air      I&O's Summary    22 Sep 2023 07:01  -  23 Sep 2023 07:00  --------------------------------------------------------  IN: 200 mL / OUT: 640 mL / NET: -440 mL        PHYSICAL EXAM:  General: initially sleeping, awakes to voice, NAD,   Lungs: no crackles, no wheezes  Heart: RRR  Abdomen: Soft  Extremities: warm, no edema, no tenderness, no focal deficit     LABS:                        10.6   6.78  )-----------( 251      ( 23 Sep 2023 06:19 )             32.2     09-23    139  |  106  |  13  ----------------------------<  100<H>  3.6   |  26  |  0.62    Ca    8.3<L>      23 Sep 2023 06:19  Phos  3.0     09-23  Mg     1.7     09-23          CAPILLARY BLOOD GLUCOSE        Urinalysis Basic - ( 23 Sep 2023 06:19 )    Color: x / Appearance: x / SG: x / pH: x  Gluc: 100 mg/dL / Ketone: x  / Bili: x / Urobili: x   Blood: x / Protein: x / Nitrite: x   Leuk Esterase: x / RBC: x / WBC x   Sq Epi: x / Non Sq Epi: x / Bacteria: x        MICRODATA:    Culture - Abscess with Gram Stain (collected 20 Sep 2023 14:43)  Source: .Abscess Pelvic Abcess for C/S  Gram Stain (20 Sep 2023 17:02):    No organisms seen    Few-moderate White blood cells  Final Report (22 Sep 2023 09:24):    Rare Escherichia coli    Rare Pseudomonas aeruginosa  Organism: Escherichia coli  Pseudomonas aeruginosa  Pseudomonas aeruginosa (22 Sep 2023 09:24)  Organism: Pseudomonas aeruginosa (22 Sep 2023 09:24)  Organism: Pseudomonas aeruginosa (22 Sep 2023 09:24)  Organism: Escherichia coli (22 Sep 2023 09:24)        RADIOLOGY/OTHER STUDIES:

## 2023-09-23 NOTE — PROGRESS NOTE ADULT - SUBJECTIVE AND OBJECTIVE BOX
STATUS POST:  Laparoscopic appendectomy    Laparoscopic partial cecectomy        POST OPERATIVE DAY #: 3    SUBJECTIVE: Patient was seen and examined by chief resident. Patient resting comfortably in bed with no acute complaints. Tolerating diet without NV. +F/+BM.      Vital Signs Last 24 Hrs  T(C): 37.1 (23 Sep 2023 16:36), Max: 37.2 (23 Sep 2023 05:00)  T(F): 98.7 (23 Sep 2023 16:36), Max: 99 (23 Sep 2023 05:00)  HR: 76 (23 Sep 2023 16:36) (66 - 79)  BP: 111/72 (23 Sep 2023 16:36) (104/67 - 119/77)  BP(mean): --  RR: 18 (23 Sep 2023 16:36) (17 - 18)  SpO2: 98% (23 Sep 2023 16:36) (96% - 98%)    Parameters below as of 23 Sep 2023 16:36  Patient On (Oxygen Delivery Method): room air        I&O's Summary    22 Sep 2023 07:01  -  23 Sep 2023 07:00  --------------------------------------------------------  IN: 200 mL / OUT: 640 mL / NET: -440 mL    23 Sep 2023 07:01  -  23 Sep 2023 17:10  --------------------------------------------------------  IN: 380 mL / OUT: 830 mL / NET: -450 mL        Physical Exam:  General Appearance: Appears well, NAD  Pulmonary: Nonlabored breathing, no respiratory distress  Cardiovascular: NSR  Abdomen: Soft, ND, appropriate incisional tenderness, incisions CDI, L CECILIO minimal serous output  Extremities: WWP, SCD's in place     LABS:                        10.6   6.78  )-----------( 251      ( 23 Sep 2023 06:19 )             32.2     09-23    139  |  106  |  13  ----------------------------<  100<H>  3.6   |  26  |  0.62    Ca    8.3<L>      23 Sep 2023 06:19  Phos  3.0     09-23  Mg     1.7     09-23        Urinalysis Basic - ( 23 Sep 2023 06:19 )    Color: x / Appearance: x / SG: x / pH: x  Gluc: 100 mg/dL / Ketone: x  / Bili: x / Urobili: x   Blood: x / Protein: x / Nitrite: x   Leuk Esterase: x / RBC: x / WBC x   Sq Epi: x / Non Sq Epi: x / Bacteria: x

## 2023-09-23 NOTE — PROGRESS NOTE ADULT - ASSESSMENT
27 y/o F with PMH of ADHD presents with abdominal pain in setting of sepsis secondary to appendicitis    Sepsis  Acute appendicitis  Cultures noted, on Pip/Tazo, WBC improving  Pain management per primary team, IS, OOB    Acute blood loss anemia   Hgb downtrended from time of admission  Hemoglobin: 10.6 g/dL (09-23-23 @ 06:19)  Hemoglobin: 11.0 g/dL (09-22-23 @ 05:30)  Hemoglobin: 11.9 g/dL (09-21-23 @ 07:32)  Hemoglobin: 13.0 g/dL (09-20-23 @ 05:53)  Hemoglobin: 14.7 g/dL (09-19-23 @ 19:25)  Hemoglobin: 14.0 g/dL (09-17-23 @ 15:01)  Partially attributable to operative blood losses   continue to trend CBC     Hypomagnesemia  Resolved    DVT ppx: per primary team  Discussed with primary team

## 2023-09-24 LAB
ANION GAP SERPL CALC-SCNC: 10 MMOL/L — SIGNIFICANT CHANGE UP (ref 5–17)
BUN SERPL-MCNC: 7 MG/DL — SIGNIFICANT CHANGE UP (ref 7–23)
CALCIUM SERPL-MCNC: 8.4 MG/DL — SIGNIFICANT CHANGE UP (ref 8.4–10.5)
CHLORIDE SERPL-SCNC: 103 MMOL/L — SIGNIFICANT CHANGE UP (ref 96–108)
CO2 SERPL-SCNC: 24 MMOL/L — SIGNIFICANT CHANGE UP (ref 22–31)
CREAT SERPL-MCNC: 0.5 MG/DL — SIGNIFICANT CHANGE UP (ref 0.5–1.3)
EGFR: 133 ML/MIN/1.73M2 — SIGNIFICANT CHANGE UP
GLUCOSE SERPL-MCNC: 97 MG/DL — SIGNIFICANT CHANGE UP (ref 70–99)
HCT VFR BLD CALC: 34.4 % — LOW (ref 34.5–45)
HGB BLD-MCNC: 11.3 G/DL — LOW (ref 11.5–15.5)
MAGNESIUM SERPL-MCNC: 1.7 MG/DL — SIGNIFICANT CHANGE UP (ref 1.6–2.6)
MCHC RBC-ENTMCNC: 30.8 PG — SIGNIFICANT CHANGE UP (ref 27–34)
MCHC RBC-ENTMCNC: 32.8 GM/DL — SIGNIFICANT CHANGE UP (ref 32–36)
MCV RBC AUTO: 93.7 FL — SIGNIFICANT CHANGE UP (ref 80–100)
NRBC # BLD: 0 /100 WBCS — SIGNIFICANT CHANGE UP (ref 0–0)
PHOSPHATE SERPL-MCNC: 3.8 MG/DL — SIGNIFICANT CHANGE UP (ref 2.5–4.5)
PLATELET # BLD AUTO: 310 K/UL — SIGNIFICANT CHANGE UP (ref 150–400)
POTASSIUM SERPL-MCNC: 4 MMOL/L — SIGNIFICANT CHANGE UP (ref 3.5–5.3)
POTASSIUM SERPL-SCNC: 4 MMOL/L — SIGNIFICANT CHANGE UP (ref 3.5–5.3)
RBC # BLD: 3.67 M/UL — LOW (ref 3.8–5.2)
RBC # FLD: 12.4 % — SIGNIFICANT CHANGE UP (ref 10.3–14.5)
SODIUM SERPL-SCNC: 137 MMOL/L — SIGNIFICANT CHANGE UP (ref 135–145)
WBC # BLD: 7.09 K/UL — SIGNIFICANT CHANGE UP (ref 3.8–10.5)
WBC # FLD AUTO: 7.09 K/UL — SIGNIFICANT CHANGE UP (ref 3.8–10.5)

## 2023-09-24 PROCEDURE — 99232 SBSQ HOSP IP/OBS MODERATE 35: CPT

## 2023-09-24 RX ORDER — MAGNESIUM SULFATE 500 MG/ML
1 VIAL (ML) INJECTION ONCE
Refills: 0 | Status: COMPLETED | OUTPATIENT
Start: 2023-09-24 | End: 2023-09-24

## 2023-09-24 RX ORDER — PIPERACILLIN AND TAZOBACTAM 4; .5 G/20ML; G/20ML
4.5 INJECTION, POWDER, LYOPHILIZED, FOR SOLUTION INTRAVENOUS EVERY 8 HOURS
Refills: 0 | Status: DISCONTINUED | OUTPATIENT
Start: 2023-09-24 | End: 2023-10-02

## 2023-09-24 RX ADMIN — PIPERACILLIN AND TAZOBACTAM 25 GRAM(S): 4; .5 INJECTION, POWDER, LYOPHILIZED, FOR SOLUTION INTRAVENOUS at 21:58

## 2023-09-24 RX ADMIN — Medication 650 MILLIGRAM(S): at 00:41

## 2023-09-24 RX ADMIN — HEPARIN SODIUM 5000 UNIT(S): 5000 INJECTION INTRAVENOUS; SUBCUTANEOUS at 05:59

## 2023-09-24 RX ADMIN — PIPERACILLIN AND TAZOBACTAM 25 GRAM(S): 4; .5 INJECTION, POWDER, LYOPHILIZED, FOR SOLUTION INTRAVENOUS at 14:17

## 2023-09-24 RX ADMIN — HEPARIN SODIUM 5000 UNIT(S): 5000 INJECTION INTRAVENOUS; SUBCUTANEOUS at 21:58

## 2023-09-24 RX ADMIN — Medication 650 MILLIGRAM(S): at 18:45

## 2023-09-24 RX ADMIN — Medication 100 GRAM(S): at 19:15

## 2023-09-24 RX ADMIN — Medication 650 MILLIGRAM(S): at 01:41

## 2023-09-24 RX ADMIN — Medication 650 MILLIGRAM(S): at 17:45

## 2023-09-24 RX ADMIN — HEPARIN SODIUM 5000 UNIT(S): 5000 INJECTION INTRAVENOUS; SUBCUTANEOUS at 14:17

## 2023-09-24 RX ADMIN — PIPERACILLIN AND TAZOBACTAM 25 GRAM(S): 4; .5 INJECTION, POWDER, LYOPHILIZED, FOR SOLUTION INTRAVENOUS at 05:59

## 2023-09-24 RX ADMIN — Medication 650 MILLIGRAM(S): at 07:43

## 2023-09-24 RX ADMIN — Medication 650 MILLIGRAM(S): at 23:45

## 2023-09-24 RX ADMIN — Medication 650 MILLIGRAM(S): at 06:42

## 2023-09-24 NOTE — PHYSICAL THERAPY INITIAL EVALUATION ADULT - ADDITIONAL COMMENTS
Pt currently resides w/ roomate in elevator apt, no CHAU. Primarily amb indep w/o AD. Denied falls within past 6 months. Indep w/ showering and dressing tasks.

## 2023-09-24 NOTE — PHYSICAL THERAPY INITIAL EVALUATION ADULT - PERTINENT HX OF CURRENT PROBLEM, REHAB EVAL
27 yo female who PMH of ADHD, presenting for worsening right sided abdominal pain since Sunday. Patient reports she went to [facility] on sunday because of right sided abdominal pain which was not fully relieved by Advil and Tylenol. After receiving a pelvic US, she was told she had a right ovarian cyst and was discharged home with instruction to manage her pain. Patient notes the pain increased and was worsened with movement, prompting her to come back to the ED. She reports intemittent nausea, with 3 episodes of non bloody emesis since sunday. She notes having decreased appetite. She reports her last BM was today. She notes her last flatus was in the morning. She denies any chest pain, shortness of breath, fevers, or chills.

## 2023-09-24 NOTE — PHYSICAL THERAPY INITIAL EVALUATION ADULT - MANUAL MUSCLE TESTING RESULTS, REHAB EVAL
Left message for patient to return call     Grossly 3/5 throughout BUE in all pivotal joints based on antigravity mobility. Grossly 4/5 throughout BLE knee flex/ext and ankle PF/DF. Grossly 3/5 throughout BLE hip flexors based on antigravity mobility.

## 2023-09-24 NOTE — PROGRESS NOTE ADULT - SUBJECTIVE AND OBJECTIVE BOX
POD#  9/20: lap appendectomy, partial cecectomy and washout    SUBJECTIVE: Patient seen at bedside with chief resident. Patient denies any nausea or vomiting. She does note lower back pain which she thinks it might be due to squatting over the toilet and how she is getting up from the bed. She still notes mild abdominal pain.       MEDICATIONS  (STANDING):  heparin   Injectable 5000 Unit(s) SubCutaneous every 8 hours  influenza   Vaccine 0.5 milliLiter(s) IntraMuscular once  lidocaine   4% Patch 1 Patch Transdermal daily  magnesium sulfate  IVPB 1 Gram(s) IV Intermittent once  piperacillin/tazobactam IVPB.. 3.375 Gram(s) IV Intermittent every 8 hours    MEDICATIONS  (PRN):  acetaminophen     Tablet .. 650 milliGRAM(s) Oral every 6 hours PRN Mild Pain (1 - 3)  benzocaine 20% Spray 1 Spray(s) Topical three times a day PRN sore throat  HYDROmorphone  Injectable 0.5 milliGRAM(s) IV Push every 4 hours PRN Severe Pain (7 - 10)  HYDROmorphone  Injectable 0.25 milliGRAM(s) IV Push every 4 hours PRN Moderate Pain (4 - 6)      Vital Signs Last 24 Hrs  T(C): 37.1 (24 Sep 2023 14:25), Max: 37.2 (24 Sep 2023 05:02)  T(F): 98.7 (24 Sep 2023 14:25), Max: 98.9 (24 Sep 2023 05:02)  HR: 79 (24 Sep 2023 14:25) (68 - 79)  BP: 110/73 (24 Sep 2023 14:25) (107/68 - 129/76)  BP(mean): --  RR: 18 (24 Sep 2023 14:25) (17 - 18)  SpO2: 97% (24 Sep 2023 14:25) (96% - 98%)    Parameters below as of 24 Sep 2023 14:25  Patient On (Oxygen Delivery Method): room air        Physical Exam:  General: NAD, resting comfortably in bed  Pulmonary: Nonlabored breathing, no respiratory distress  Cardiovascular: NSR  Abdominal: soft, Appropriate lower quadrant tenderness. Incision clean, dry, and intact.   Extremities: WWP, normal strength  Neuro: A/O x 3, CNs II-XII grossly intact, no focal deficits, normal motor/sensation  Pulses: palpable distal pulses    I&O's Summary    23 Sep 2023 07:01  -  24 Sep 2023 07:00  --------------------------------------------------------  IN: 380 mL / OUT: 1450 mL / NET: -1070 mL    24 Sep 2023 07:01  -  24 Sep 2023 15:12  --------------------------------------------------------  IN: 650 mL / OUT: 1900 mL / NET: -1250 mL        LABS:                        11.3   7.09  )-----------( 310      ( 24 Sep 2023 08:20 )             34.4     09-24    137  |  103  |  7   ----------------------------<  97  4.0   |  24  |  0.50    Ca    8.4      24 Sep 2023 08:20  Phos  3.8     09-24  Mg     1.7     09-24        Urinalysis Basic - ( 24 Sep 2023 08:20 )    Color: x / Appearance: x / SG: x / pH: x  Gluc: 97 mg/dL / Ketone: x  / Bili: x / Urobili: x   Blood: x / Protein: x / Nitrite: x   Leuk Esterase: x / RBC: x / WBC x   Sq Epi: x / Non Sq Epi: x / Bacteria: x      CAPILLARY BLOOD GLUCOSE            RADIOLOGY & ADDITIONAL STUDIES:

## 2023-09-24 NOTE — PROGRESS NOTE ADULT - ASSESSMENT
28 year old female with PSH of left knee ACL repair presenting with worsening right abdomen pain for 3 days. ViWBC Count: 14.88, with a neutrophilic shift.  CT Abdomen and Pelvis w/ IV Cont showed Perforated appendicitis with appendicolith and evidence of peritonitis s/p lap appendectomy, partial cecectomy and washout (9/20)    Low rest diet  Pain and nausea control PRN  Zosyn  CECILIO  SCDs/OOBA

## 2023-09-24 NOTE — PROGRESS NOTE ADULT - ASSESSMENT
27 y/o F with PMH of ADHD presents with abdominal pain in setting of sepsis secondary to appendicitis    Sepsis  Acute appendicitis  Cultures noted, on Pip/Tazo, WBC improving. Consider ID consult for PO recommendations in view of sensitivities. Drain management per surgery   Pain management per primary team, IS, OOB  Diet as tolerated     Acute blood loss anemia   Hgb downtrended from time of admission  Hemoglobin: 10.6 g/dL (09-23-23 @ 06:19)  Hemoglobin: 11.0 g/dL (09-22-23 @ 05:30)  Hemoglobin: 11.9 g/dL (09-21-23 @ 07:32)  Hemoglobin: 13.0 g/dL (09-20-23 @ 05:53)  Hemoglobin: 14.7 g/dL (09-19-23 @ 19:25)  Hemoglobin: 14.0 g/dL (09-17-23 @ 15:01)  Partially attributable to operative blood losses   continue to trend CBC     Hypomagnesemia  Resolved    DVT ppx: per primary team  Discussed with primary team

## 2023-09-24 NOTE — PROGRESS NOTE ADULT - SUBJECTIVE AND OBJECTIVE BOX
SUBJECTIVE:  Patient was seen and examined at bedside. Reports feeling at baseline, denies complaints. No events or complaints reported    Review of systems: No fever, chills, dizziness, HA, Changes in vision, CP, dyspnea, nausea or vomiting, dysuria, changes in bowel movements, LE edema. Rest of 12 point Review of systems negative unless otherwise documented elsewhere in note.     Diet, Regular:   Fiber/Residue Restricted (LOWFIBER) (09-22-23 @ 16:10) [Active]      MEDICATIONS:  MEDICATIONS  (STANDING):  heparin   Injectable 5000 Unit(s) SubCutaneous every 8 hours  influenza   Vaccine 0.5 milliLiter(s) IntraMuscular once  lidocaine   4% Patch 1 Patch Transdermal daily  piperacillin/tazobactam IVPB.. 3.375 Gram(s) IV Intermittent every 8 hours    MEDICATIONS  (PRN):  acetaminophen     Tablet .. 650 milliGRAM(s) Oral every 6 hours PRN Mild Pain (1 - 3)  benzocaine 20% Spray 1 Spray(s) Topical three times a day PRN sore throat  HYDROmorphone  Injectable 0.5 milliGRAM(s) IV Push every 4 hours PRN Severe Pain (7 - 10)  HYDROmorphone  Injectable 0.25 milliGRAM(s) IV Push every 4 hours PRN Moderate Pain (4 - 6)      Allergies    No Known Allergies    Intolerances        OBJECTIVE:  Vital Signs Last 24 Hrs  T(C): 37.2 (24 Sep 2023 05:02), Max: 37.2 (24 Sep 2023 05:02)  T(F): 98.9 (24 Sep 2023 05:02), Max: 98.9 (24 Sep 2023 05:02)  HR: 69 (24 Sep 2023 05:02) (68 - 76)  BP: 107/68 (24 Sep 2023 05:02) (107/68 - 129/76)  BP(mean): --  RR: 17 (24 Sep 2023 05:02) (17 - 18)  SpO2: 96% (24 Sep 2023 05:02) (96% - 98%)    Parameters below as of 24 Sep 2023 05:02  Patient On (Oxygen Delivery Method): room air      I&O's Summary    23 Sep 2023 07:01  -  24 Sep 2023 07:00  --------------------------------------------------------  IN: 380 mL / OUT: 1450 mL / NET: -1070 mL        PHYSICAL EXAM:  General: AOX3, NAD, sitting in bed, speaking in full sentences  Lungs: no labored breathing   Abdomen: Soft, drain in place  Extremities: no edema, no tenderness, no focal deficit       LABS:                        11.3   7.09  )-----------( 310      ( 24 Sep 2023 08:20 )             34.4     09-24    137  |  103  |  7   ----------------------------<  97  4.0   |  24  |  0.50    Ca    8.4      24 Sep 2023 08:20  Phos  3.8     09-24  Mg     1.7     09-24          CAPILLARY BLOOD GLUCOSE        Urinalysis Basic - ( 24 Sep 2023 08:20 )    Color: x / Appearance: x / SG: x / pH: x  Gluc: 97 mg/dL / Ketone: x  / Bili: x / Urobili: x   Blood: x / Protein: x / Nitrite: x   Leuk Esterase: x / RBC: x / WBC x   Sq Epi: x / Non Sq Epi: x / Bacteria: x        MICRODATA:      RADIOLOGY/OTHER STUDIES:

## 2023-09-25 LAB
ANION GAP SERPL CALC-SCNC: 10 MMOL/L — SIGNIFICANT CHANGE UP (ref 5–17)
BUN SERPL-MCNC: 4 MG/DL — LOW (ref 7–23)
CALCIUM SERPL-MCNC: 9 MG/DL — SIGNIFICANT CHANGE UP (ref 8.4–10.5)
CHLORIDE SERPL-SCNC: 104 MMOL/L — SIGNIFICANT CHANGE UP (ref 96–108)
CO2 SERPL-SCNC: 27 MMOL/L — SIGNIFICANT CHANGE UP (ref 22–31)
CREAT SERPL-MCNC: 0.58 MG/DL — SIGNIFICANT CHANGE UP (ref 0.5–1.3)
EGFR: 128 ML/MIN/1.73M2 — SIGNIFICANT CHANGE UP
GLUCOSE SERPL-MCNC: 114 MG/DL — HIGH (ref 70–99)
HCT VFR BLD CALC: 35.9 % — SIGNIFICANT CHANGE UP (ref 34.5–45)
HGB BLD-MCNC: 12.1 G/DL — SIGNIFICANT CHANGE UP (ref 11.5–15.5)
MAGNESIUM SERPL-MCNC: 2.1 MG/DL — SIGNIFICANT CHANGE UP (ref 1.6–2.6)
MCHC RBC-ENTMCNC: 31.6 PG — SIGNIFICANT CHANGE UP (ref 27–34)
MCHC RBC-ENTMCNC: 33.7 GM/DL — SIGNIFICANT CHANGE UP (ref 32–36)
MCV RBC AUTO: 93.7 FL — SIGNIFICANT CHANGE UP (ref 80–100)
NRBC # BLD: 0 /100 WBCS — SIGNIFICANT CHANGE UP (ref 0–0)
PHOSPHATE SERPL-MCNC: 3.7 MG/DL — SIGNIFICANT CHANGE UP (ref 2.5–4.5)
PLATELET # BLD AUTO: 362 K/UL — SIGNIFICANT CHANGE UP (ref 150–400)
POTASSIUM SERPL-MCNC: 3.6 MMOL/L — SIGNIFICANT CHANGE UP (ref 3.5–5.3)
POTASSIUM SERPL-SCNC: 3.6 MMOL/L — SIGNIFICANT CHANGE UP (ref 3.5–5.3)
RBC # BLD: 3.83 M/UL — SIGNIFICANT CHANGE UP (ref 3.8–5.2)
RBC # FLD: 12.4 % — SIGNIFICANT CHANGE UP (ref 10.3–14.5)
SODIUM SERPL-SCNC: 141 MMOL/L — SIGNIFICANT CHANGE UP (ref 135–145)
WBC # BLD: 8.32 K/UL — SIGNIFICANT CHANGE UP (ref 3.8–10.5)
WBC # FLD AUTO: 8.32 K/UL — SIGNIFICANT CHANGE UP (ref 3.8–10.5)

## 2023-09-25 PROCEDURE — 99233 SBSQ HOSP IP/OBS HIGH 50: CPT

## 2023-09-25 PROCEDURE — 99254 IP/OBS CNSLTJ NEW/EST MOD 60: CPT

## 2023-09-25 PROCEDURE — 74177 CT ABD & PELVIS W/CONTRAST: CPT | Mod: 26

## 2023-09-25 RX ORDER — LIDOCAINE 4 G/100G
1 CREAM TOPICAL ONCE
Refills: 0 | Status: COMPLETED | OUTPATIENT
Start: 2023-09-25 | End: 2023-09-25

## 2023-09-25 RX ORDER — POTASSIUM CHLORIDE 20 MEQ
20 PACKET (EA) ORAL ONCE
Refills: 0 | Status: COMPLETED | OUTPATIENT
Start: 2023-09-25 | End: 2023-09-25

## 2023-09-25 RX ORDER — IOHEXOL 300 MG/ML
30 INJECTION, SOLUTION INTRAVENOUS ONCE
Refills: 0 | Status: COMPLETED | OUTPATIENT
Start: 2023-09-25 | End: 2023-09-25

## 2023-09-25 RX ADMIN — Medication 650 MILLIGRAM(S): at 18:20

## 2023-09-25 RX ADMIN — HEPARIN SODIUM 5000 UNIT(S): 5000 INJECTION INTRAVENOUS; SUBCUTANEOUS at 05:58

## 2023-09-25 RX ADMIN — LIDOCAINE 1 PATCH: 4 CREAM TOPICAL at 12:00

## 2023-09-25 RX ADMIN — Medication 20 MILLIEQUIVALENT(S): at 18:20

## 2023-09-25 RX ADMIN — HYDROMORPHONE HYDROCHLORIDE 0.5 MILLIGRAM(S): 2 INJECTION INTRAMUSCULAR; INTRAVENOUS; SUBCUTANEOUS at 01:05

## 2023-09-25 RX ADMIN — PIPERACILLIN AND TAZOBACTAM 25 GRAM(S): 4; .5 INJECTION, POWDER, LYOPHILIZED, FOR SOLUTION INTRAVENOUS at 05:58

## 2023-09-25 RX ADMIN — PIPERACILLIN AND TAZOBACTAM 25 GRAM(S): 4; .5 INJECTION, POWDER, LYOPHILIZED, FOR SOLUTION INTRAVENOUS at 22:13

## 2023-09-25 RX ADMIN — Medication 650 MILLIGRAM(S): at 12:02

## 2023-09-25 RX ADMIN — Medication 650 MILLIGRAM(S): at 19:11

## 2023-09-25 RX ADMIN — Medication 650 MILLIGRAM(S): at 06:58

## 2023-09-25 RX ADMIN — HEPARIN SODIUM 5000 UNIT(S): 5000 INJECTION INTRAVENOUS; SUBCUTANEOUS at 14:24

## 2023-09-25 RX ADMIN — LIDOCAINE 1 PATCH: 4 CREAM TOPICAL at 07:03

## 2023-09-25 RX ADMIN — Medication 650 MILLIGRAM(S): at 05:58

## 2023-09-25 RX ADMIN — Medication 650 MILLIGRAM(S): at 00:45

## 2023-09-25 RX ADMIN — HYDROMORPHONE HYDROCHLORIDE 0.5 MILLIGRAM(S): 2 INJECTION INTRAMUSCULAR; INTRAVENOUS; SUBCUTANEOUS at 00:50

## 2023-09-25 RX ADMIN — IOHEXOL 30 MILLILITER(S): 300 INJECTION, SOLUTION INTRAVENOUS at 09:36

## 2023-09-25 RX ADMIN — Medication 650 MILLIGRAM(S): at 12:45

## 2023-09-25 RX ADMIN — HYDROMORPHONE HYDROCHLORIDE 0.5 MILLIGRAM(S): 2 INJECTION INTRAMUSCULAR; INTRAVENOUS; SUBCUTANEOUS at 23:45

## 2023-09-25 RX ADMIN — LIDOCAINE 1 PATCH: 4 CREAM TOPICAL at 00:50

## 2023-09-25 RX ADMIN — HEPARIN SODIUM 5000 UNIT(S): 5000 INJECTION INTRAVENOUS; SUBCUTANEOUS at 22:13

## 2023-09-25 RX ADMIN — PIPERACILLIN AND TAZOBACTAM 25 GRAM(S): 4; .5 INJECTION, POWDER, LYOPHILIZED, FOR SOLUTION INTRAVENOUS at 14:24

## 2023-09-25 NOTE — PROGRESS NOTE ADULT - SUBJECTIVE AND OBJECTIVE BOX
SUBJECTIVE: Pt seen and examined at the bedside by surgical team. Doing well, reports some overnight pain, pain well controlled this AM.    Vital Signs Last 24 Hrs  T(C): 37.1 (25 Sep 2023 13:45), Max: 37.2 (24 Sep 2023 17:08)  T(F): 98.8 (25 Sep 2023 13:45), Max: 99 (24 Sep 2023 17:08)  HR: 74 (25 Sep 2023 13:45) (65 - 76)  BP: 105/71 (25 Sep 2023 13:45) (97/62 - 117/77)  BP(mean): --  RR: 17 (25 Sep 2023 13:45) (17 - 18)  SpO2: 96% (25 Sep 2023 13:45) (96% - 99%)    Parameters below as of 25 Sep 2023 13:45  Patient On (Oxygen Delivery Method): room air        I&O's Summary    24 Sep 2023 07:01  -  25 Sep 2023 07:00  --------------------------------------------------------  IN: 800 mL / OUT: 4265 mL / NET: -3465 mL    25 Sep 2023 07:01  -  25 Sep 2023 15:57  --------------------------------------------------------  IN: 100 mL / OUT: 1600 mL / NET: -1500 mL        Physical Exam:  General Appearance: Appears well, NAD  Pulmonary: Nonlabored breathing, no respiratory distress  Cardiovascular: NSR  Abdomen: Soft, nondisteded, appropriate incisional tenderness, dressings clean and dry and intact  Extremities: WWP, SCD's in place     LABS:                        12.1   8.32  )-----------( 362      ( 25 Sep 2023 10:25 )             35.9     09-25    141  |  104  |  4<L>  ----------------------------<  114<H>  3.6   |  27  |  0.58    Ca    9.0      25 Sep 2023 10:25  Phos  3.7     09-25  Mg     2.1     09-25        Urinalysis Basic - ( 25 Sep 2023 10:25 )    Color: x / Appearance: x / SG: x / pH: x  Gluc: 114 mg/dL / Ketone: x  / Bili: x / Urobili: x   Blood: x / Protein: x / Nitrite: x   Leuk Esterase: x / RBC: x / WBC x   Sq Epi: x / Non Sq Epi: x / Bacteria: x

## 2023-09-25 NOTE — CONSULT NOTE ADULT - SUBJECTIVE AND OBJECTIVE BOX
INFECTIOUS DISEASES INITIAL CONSULT NOTE    HPI:  27 yo female who PMH of ADHD, presenting for worsening right sided abdominal pain since Sunday. Patient reports she went to [facility] on sunday because of right sided abdominal pain which was not fully relieved by Advil and Tylenol. After receiving a pelvic US, she was told she had a right ovarian cyst and was discharged home with instruction to manage her pain. Patient notes the pain increased and was worsened with movement, prompting her to come back to the ED. She reports intemittent nausea, with 3 episodes of non bloody emesis since sunday. She notes having decreased appetite. She reports her last BM was today. She notes her last flatus was in the morning. She denies any chest pain, shortness of breath, fevers, or chills.   (20 Sep 2023 03:03)      PAST MEDICAL & SURGICAL HISTORY:  ADHD      History of repair of anterior cruciate ligament of left knee            Review of Systems:   Constitutional, eyes, ENT, cardiovascular, respiratory, gastrointestinal, genitourinary, integumentary, neurological, psychiatric and heme/lymph are otherwise negative other than noted above       ANTIBIOTICS:  MEDICATIONS  (STANDING):  heparin   Injectable 5000 Unit(s) SubCutaneous every 8 hours  influenza   Vaccine 0.5 milliLiter(s) IntraMuscular once  piperacillin/tazobactam IVPB.. 4.5 Gram(s) IV Intermittent every 8 hours  potassium chloride   Powder 20 milliEquivalent(s) Oral once    MEDICATIONS  (PRN):  acetaminophen     Tablet .. 650 milliGRAM(s) Oral every 6 hours PRN Mild Pain (1 - 3)  benzocaine 20% Spray 1 Spray(s) Topical three times a day PRN sore throat  HYDROmorphone  Injectable 0.5 milliGRAM(s) IV Push every 4 hours PRN Severe Pain (7 - 10)  HYDROmorphone  Injectable 0.25 milliGRAM(s) IV Push every 4 hours PRN Moderate Pain (4 - 6)      Allergies    No Known Allergies    Intolerances        SOCIAL HISTORY:    FAMILY HISTORY:  No pertinent family history in first degree relatives     no FH leading to current infection    Vital Signs Last 24 Hrs  T(C): 37.1 (25 Sep 2023 13:45), Max: 37.2 (24 Sep 2023 17:08)  T(F): 98.8 (25 Sep 2023 13:45), Max: 99 (24 Sep 2023 17:08)  HR: 74 (25 Sep 2023 13:45) (65 - 76)  BP: 105/71 (25 Sep 2023 13:45) (97/62 - 117/77)  BP(mean): --  RR: 17 (25 Sep 2023 13:45) (17 - 18)  SpO2: 96% (25 Sep 2023 13:45) (96% - 99%)    Parameters below as of 25 Sep 2023 13:45  Patient On (Oxygen Delivery Method): room air        09-24-23 @ 07:01  -  09-25-23 @ 07:00  --------------------------------------------------------  IN: 800 mL / OUT: 4265 mL / NET: -3465 mL    09-25-23 @ 07:01  -  09-25-23 @ 16:24  --------------------------------------------------------  IN: 125 mL / OUT: 1600 mL / NET: -1475 mL        PHYSICAL EXAM:  Constitutional: alert, NAD  Eyes: the sclera and conjunctiva were normal.   ENT: the ears and nose were normal in appearance.   Neck: the appearance of the neck was normal and the neck was supple.   Pulmonary: no respiratory distress and lungs were clear to auscultation bilaterally.   Heart: heart rate was normal and rhythm regular, normal S1 and S2  Vascular:. there was no peripheral edema  Abdomen: normal bowel sounds, soft, non-tender  Neurological: no focal deficits.   Psychiatric: the affect was normal      LABS:                        12.1   8.32  )-----------( 362      ( 25 Sep 2023 10:25 )             35.9     09-25    141  |  104  |  4<L>  ----------------------------<  114<H>  3.6   |  27  |  0.58    Ca    9.0      25 Sep 2023 10:25  Phos  3.7     09-25  Mg     2.1     09-25        Urinalysis Basic - ( 25 Sep 2023 10:25 )    Color: x / Appearance: x / SG: x / pH: x  Gluc: 114 mg/dL / Ketone: x  / Bili: x / Urobili: x   Blood: x / Protein: x / Nitrite: x   Leuk Esterase: x / RBC: x / WBC x   Sq Epi: x / Non Sq Epi: x / Bacteria: x        MICROBIOLOGY:    RADIOLOGY & ADDITIONAL STUDIES:   INFECTIOUS DISEASES INITIAL CONSULT NOTE    HPI:  27 yo female who PMH of ADHD, presenting for worsening right sided abdominal pain since Sunday. Patient reports she went to [facility] on sunday because of right sided abdominal pain which was not fully relieved by Advil and Tylenol. After receiving a pelvic US, she was told she had a right ovarian cyst and was discharged home with instruction to manage her pain. Patient notes the pain increased and was worsened with movement, prompting her to come back to the ED. She reports intemittent nausea, with 3 episodes of non bloody emesis since sunday. She notes having decreased appetite. She reports her last BM was today. She notes her last flatus was in the morning. She denies any chest pain, shortness of breath, fevers, or chills.   (20 Sep 2023 03:03)  Patient is s/p day 5 laparoscopic appendectomy, partial cecectomy and washout. Patient states that she is still having intermittent right lower quadrant pain. She also admits to lower back pain that she states is likely due to the way she is sleeping. Her mother was with her at bedside.    PAST MEDICAL & SURGICAL HISTORY:  ADHD      History of repair of anterior cruciate ligament of left knee            Review of Systems:   Constitutional, eyes, ENT, cardiovascular, respiratory, gastrointestinal, genitourinary, integumentary, neurological, psychiatric and heme/lymph are otherwise negative other than noted above       ANTIBIOTICS:  MEDICATIONS  (STANDING):  heparin   Injectable 5000 Unit(s) SubCutaneous every 8 hours  influenza   Vaccine 0.5 milliLiter(s) IntraMuscular once  piperacillin/tazobactam IVPB.. 4.5 Gram(s) IV Intermittent every 8 hours  potassium chloride   Powder 20 milliEquivalent(s) Oral once    MEDICATIONS  (PRN):  acetaminophen     Tablet .. 650 milliGRAM(s) Oral every 6 hours PRN Mild Pain (1 - 3)  benzocaine 20% Spray 1 Spray(s) Topical three times a day PRN sore throat  HYDROmorphone  Injectable 0.5 milliGRAM(s) IV Push every 4 hours PRN Severe Pain (7 - 10)  HYDROmorphone  Injectable 0.25 milliGRAM(s) IV Push every 4 hours PRN Moderate Pain (4 - 6)      Allergies    No Known Allergies    Intolerances        SOCIAL HISTORY:    FAMILY HISTORY:  No pertinent family history in first degree relatives     no FH leading to current infection    Vital Signs Last 24 Hrs  T(C): 37.1 (25 Sep 2023 13:45), Max: 37.2 (24 Sep 2023 17:08)  T(F): 98.8 (25 Sep 2023 13:45), Max: 99 (24 Sep 2023 17:08)  HR: 74 (25 Sep 2023 13:45) (65 - 76)  BP: 105/71 (25 Sep 2023 13:45) (97/62 - 117/77)  BP(mean): --  RR: 17 (25 Sep 2023 13:45) (17 - 18)  SpO2: 96% (25 Sep 2023 13:45) (96% - 99%)    Parameters below as of 25 Sep 2023 13:45  Patient On (Oxygen Delivery Method): room air        09-24-23 @ 07:01  -  09-25-23 @ 07:00  --------------------------------------------------------  IN: 800 mL / OUT: 4265 mL / NET: -3465 mL    09-25-23 @ 07:01  -  09-25-23 @ 16:24  --------------------------------------------------------  IN: 125 mL / OUT: 1600 mL / NET: -1475 mL        PHYSICAL EXAM:  Constitutional: alert, NAD  Eyes: the sclera and conjunctiva were normal.   ENT: the ears and nose were normal in appearance.   Neck: the appearance of the neck was normal and the neck was supple.   Pulmonary: no respiratory distress and lungs were clear to auscultation bilaterally.   Heart: heart rate was normal and rhythm regular, normal S1 and S2  Vascular:. there was no peripheral edema  Abdomen: normal bowel sounds, soft, tender RLQ upon palpation, + Rovsing's sign  Neurological: no focal deficits.   Psychiatric: the affect was normal      LABS:                        12.1   8.32  )-----------( 362      ( 25 Sep 2023 10:25 )             35.9     09-25    141  |  104  |  4<L>  ----------------------------<  114<H>  3.6   |  27  |  0.58    Ca    9.0      25 Sep 2023 10:25  Phos  3.7     09-25  Mg     2.1     09-25        Urinalysis Basic - ( 25 Sep 2023 10:25 )    Color: x / Appearance: x / SG: x / pH: x  Gluc: 114 mg/dL / Ketone: x  / Bili: x / Urobili: x   Blood: x / Protein: x / Nitrite: x   Leuk Esterase: x / RBC: x / WBC x   Sq Epi: x / Non Sq Epi: x / Bacteria: x        MICROBIOLOGY:    RADIOLOGY & ADDITIONAL STUDIES:   INFECTIOUS DISEASES INITIAL CONSULT NOTE    HPI:  27 yo female who PMH of ADHD, presenting for worsening right sided abdominal pain since Sunday. Patient reports she went to [facility] on sunday because of right sided abdominal pain which was not fully relieved by Advil and Tylenol. After receiving a pelvic US, she was told she had a right ovarian cyst and was discharged home with instruction to manage her pain. Patient notes the pain increased and was worsened with movement, prompting her to come back to the ED. She reports intemittent nausea, with 3 episodes of non bloody emesis since sunday. She notes having decreased appetite.   Patient is s/p day 5 laparoscopic appendectomy, partial cecectomy and washout. Patient states that she is still having intermittent right lower quadrant pain. She also admits to lower back pain that she states is likely due to the way she is sleeping. Her mother was with her at bedside.    PAST MEDICAL & SURGICAL HISTORY:  ADHD      History of repair of anterior cruciate ligament of left knee            Review of Systems:   Constitutional, eyes, ENT, cardiovascular, respiratory, gastrointestinal, genitourinary, integumentary, neurological, psychiatric and heme/lymph are otherwise negative other than noted above       ANTIBIOTICS:  MEDICATIONS  (STANDING):  heparin   Injectable 5000 Unit(s) SubCutaneous every 8 hours  influenza   Vaccine 0.5 milliLiter(s) IntraMuscular once  piperacillin/tazobactam IVPB.. 4.5 Gram(s) IV Intermittent every 8 hours  potassium chloride   Powder 20 milliEquivalent(s) Oral once    MEDICATIONS  (PRN):  acetaminophen     Tablet .. 650 milliGRAM(s) Oral every 6 hours PRN Mild Pain (1 - 3)  benzocaine 20% Spray 1 Spray(s) Topical three times a day PRN sore throat  HYDROmorphone  Injectable 0.5 milliGRAM(s) IV Push every 4 hours PRN Severe Pain (7 - 10)  HYDROmorphone  Injectable 0.25 milliGRAM(s) IV Push every 4 hours PRN Moderate Pain (4 - 6)      Allergies    No Known Allergies    Intolerances        SOCIAL HISTORY:    FAMILY HISTORY:  No pertinent family history in first degree relatives     no FH leading to current infection    Vital Signs Last 24 Hrs  T(C): 37.1 (25 Sep 2023 13:45), Max: 37.2 (24 Sep 2023 17:08)  T(F): 98.8 (25 Sep 2023 13:45), Max: 99 (24 Sep 2023 17:08)  HR: 74 (25 Sep 2023 13:45) (65 - 76)  BP: 105/71 (25 Sep 2023 13:45) (97/62 - 117/77)  BP(mean): --  RR: 17 (25 Sep 2023 13:45) (17 - 18)  SpO2: 96% (25 Sep 2023 13:45) (96% - 99%)    Parameters below as of 25 Sep 2023 13:45  Patient On (Oxygen Delivery Method): room air        09-24-23 @ 07:01  -  09-25-23 @ 07:00  --------------------------------------------------------  IN: 800 mL / OUT: 4265 mL / NET: -3465 mL    09-25-23 @ 07:01  -  09-25-23 @ 16:24  --------------------------------------------------------  IN: 125 mL / OUT: 1600 mL / NET: -1475 mL        PHYSICAL EXAM:  Constitutional: alert, NAD  Eyes: the sclera and conjunctiva were normal.   ENT: the ears and nose were normal in appearance.   Neck: the appearance of the neck was normal and the neck was supple.   Pulmonary: no respiratory distress and lungs were clear to auscultation bilaterally.   Heart: heart rate was normal and rhythm regular, normal S1 and S2  Vascular:. there was no peripheral edema  Abdomen: normal bowel sounds, soft, tender RLQ upon palpation, + Rovsing's sign  Neurological: no focal deficits.   Psychiatric: the affect was normal      LABS:                        12.1   8.32  )-----------( 362      ( 25 Sep 2023 10:25 )             35.9     09-25    141  |  104  |  4<L>  ----------------------------<  114<H>  3.6   |  27  |  0.58    Ca    9.0      25 Sep 2023 10:25  Phos  3.7     09-25  Mg     2.1     09-25        Urinalysis Basic - ( 25 Sep 2023 10:25 )    Color: x / Appearance: x / SG: x / pH: x  Gluc: 114 mg/dL / Ketone: x  / Bili: x / Urobili: x   Blood: x / Protein: x / Nitrite: x   Leuk Esterase: x / RBC: x / WBC x   Sq Epi: x / Non Sq Epi: x / Bacteria: x        MICROBIOLOGY:    RADIOLOGY & ADDITIONAL STUDIES:

## 2023-09-25 NOTE — CONSULT NOTE ADULT - ASSESSMENT
29 yo female who PMH of ADHD presenting with intermittent right lower quadrant pain and lower back pain s/p day 5 laparoscopic appendectomy, partial cecectomy and washout due to perforated appendicitis and presumed peritonitis. Culture taken from perforated appendix in OR is positive for pseudomonas and ceftriaxone resistant E.coli. Culture shows that pseudomonas and E.coli is sensitive to zosyn. Patient proves to be clinically improving on zosyn 4.5 mg IV every 8 hours.     Recommendations:  #Perforated Appendicitis and peritonitis  - continue zosyn 4.5 mg IV every 8 hours through 9/30/23  -consider placement of PICC line if discharged before 9/30/23 27 yo female who PMH of ADHD presenting with intermittent right lower quadrant pain and lower back pain, found to have perforated appendicitis. s/p day 5 laparoscopic appendectomy, partial cecectomy and washout due to perforated appendicitis and presumed peritonitis. Culture taken from OR is positive for pseudomonas and ceftriaxone resistant E.coli. Culture shows that pseudomonas and E.coli is sensitive to zosyn. Patient proves to be clinically improving on zosyn 4.5 mg IV every 8 hours.       Recommendations:  #Perforated Appendicitis and peritonitis  Pt clinically improving since OR procedure on 9/20. Has been afebrile with normal wbc.   - continue zosyn 4.5 mg IV every 8 hours for total 10 day course (day 1 = 9/20, last day = 9/29)  - monitor CBC w/ differential daily    Team 1 will continue to follow.      Discussed with Attending, not final until signed by Attending.   27 yo female who PMH of ADHD presenting with intermittent right lower quadrant pain and lower back pain, found to have perforated appendicitis. s/p day 5 laparoscopic appendectomy, partial cecectomy and washout due to perforated appendicitis and presumed peritonitis. Culture taken from OR is positive for pseudomonas and ceftriaxone resistant E.coli. Culture shows that pseudomonas and E.coli is sensitive to zosyn. Patient proves to be clinically improving on zosyn 4.5 mg IV every 8 hours.       Recommendations:  #Perforated Appendicitis and peritonitis  Pt clinically improving since OR procedure on 9/20. Has been afebrile with normal wbc.   - continue zosyn 4.5 mg IV every 8 hours for total 10 day course (day 1 = 9/20, last day = 9/30)  - monitor CBC w/ differential daily    Team 1 will continue to follow.      Discussed with Attending, not final until signed by Attending.

## 2023-09-25 NOTE — DIETITIAN INITIAL EVALUATION ADULT - OTHER INFO
28 year old female with PSH of left knee ACL repair presenting with worsening right abdomen pain for 3 days. ViWBC Count: 14.88, with a neutrophilic shift.  CT Abdomen and Pelvis w/ IV Cont showed Perforated appendicitis with appendicolith and evidence of peritonitis s/p lap appendectomy, partial cecectomy and washout (9/20)    Pt seen in room for nutrition assessment. Pt reports fair appetite PTA and during hospital stay. As per diet recall PTA: pt endorsed she was eating well, eating smoothies, eggs, chicken, salads, etc. on a daily/weekly basis. Currently on low fiber diet, tolerating well overall, noted with 50-70% PO intakes overall. No cultural, Orthodox, or ethnic food preferences noted. No known food allergies. Denies wt changes, reports wt stability at current wt. Dosing wt: 138 pounds, pt stated usual body weight is ~139-142 pounds, Ideal body weight: 100 pounds, pt is 138% of ideal body weight. Denies nausea, vomiting, diarrhea, constipation, last BM on 9/25/23, some abdominal discomfort noted. No edema. Skin: abdominal surgical incision (left lower quadrant CECILIO drain), no pressure ulcers. Charli: 20. No issues chewing or swallowing noted. Noted with some pain relief from level 3 to level 0. Labs reviewed: low BUN (4), elevated serum Glucose (114); RD to continue to monitor trends. Nutritionally pertinent medications/supplements: IV antibiotics. RD observed pt with no overt signs of muscle or fat wasting. Based on ASPEN guidelines, pt does not meet criteria for malnutrition at this time. Pt amenable to education; RD provided education in regards to the importance of adequate macro and micronutrients, as well as hydration to support ADLs, maintain energy levels and overall functional/nutritional status. General healthful education provided. Nutrient-dense foods promoted. Low fiber diet education provided, diet reviewed, examples of low and high fiber items discussed. RD discussed pt's elevated nutrient needs related to postoperative demands, emphasizing the role that protein plays in the healing process. Pt was receptive, verbalized understanding. No additional nutrition-related concerns. Will continue to follow per RD protocol. Additional nutrition recommendations below to follow.

## 2023-09-25 NOTE — DIETITIAN INITIAL EVALUATION ADULT - PERTINENT MEDS FT
MEDICATIONS  (STANDING):  heparin   Injectable 5000 Unit(s) SubCutaneous every 8 hours  influenza   Vaccine 0.5 milliLiter(s) IntraMuscular once  piperacillin/tazobactam IVPB.. 4.5 Gram(s) IV Intermittent every 8 hours  potassium chloride   Powder 20 milliEquivalent(s) Oral once    MEDICATIONS  (PRN):  acetaminophen     Tablet .. 650 milliGRAM(s) Oral every 6 hours PRN Mild Pain (1 - 3)  benzocaine 20% Spray 1 Spray(s) Topical three times a day PRN sore throat  HYDROmorphone  Injectable 0.5 milliGRAM(s) IV Push every 4 hours PRN Severe Pain (7 - 10)  HYDROmorphone  Injectable 0.25 milliGRAM(s) IV Push every 4 hours PRN Moderate Pain (4 - 6)

## 2023-09-25 NOTE — DIETITIAN INITIAL EVALUATION ADULT - NUTRITIONGOAL OUTCOME1
Pt to consistently meet at least 75% of EEE via tolerated route that is consistent with goals of care during hospital stay
Awake/Alert

## 2023-09-25 NOTE — DIETITIAN INITIAL EVALUATION ADULT - PERSON TAUGHT/METHOD
Pt amenable to education; RD provided education in regards to the importance of adequate macro and micronutrients, as well as hydration to support ADLs, maintain energy levels and overall functional/nutritional status. General healthful education provided. Nutrient-dense foods promoted. Low fiber diet education provided, diet reviewed, examples of low and high fiber items discussed. RD discussed pt's elevated nutrient needs related to postoperative demands, emphasizing the role that protein plays in the healing process. Pt was receptive, verbalized understanding./verbal instruction/patient instructed

## 2023-09-25 NOTE — PROGRESS NOTE ADULT - SUBJECTIVE AND OBJECTIVE BOX
SUBJECTIVE:  Patient was seen and examined at bedside. Reports abdominal pain overnight, passing flatus, no N/V. Pending CT abdomen/pelvis with PO contrast. Discussed at length with patient's mother, her questions and concerns were addressed to her satisfaction No other complaints or events reported.    Review of systems: No fever, chills, dizziness, HA, Changes in vision, CP, dyspnea, nausea or vomiting, dysuria, changes in bowel movements, LE edema. Rest of 12 point Review of systems negative unless otherwise documented elsewhere in note.     Diet, Regular:   Fiber/Residue Restricted (LOWFIBER) (09-22-23 @ 16:10) [Active]      MEDICATIONS:  MEDICATIONS  (STANDING):  heparin   Injectable 5000 Unit(s) SubCutaneous every 8 hours  influenza   Vaccine 0.5 milliLiter(s) IntraMuscular once  piperacillin/tazobactam IVPB.. 4.5 Gram(s) IV Intermittent every 8 hours  potassium chloride   Powder 20 milliEquivalent(s) Oral once    MEDICATIONS  (PRN):  acetaminophen     Tablet .. 650 milliGRAM(s) Oral every 6 hours PRN Mild Pain (1 - 3)  benzocaine 20% Spray 1 Spray(s) Topical three times a day PRN sore throat  HYDROmorphone  Injectable 0.5 milliGRAM(s) IV Push every 4 hours PRN Severe Pain (7 - 10)  HYDROmorphone  Injectable 0.25 milliGRAM(s) IV Push every 4 hours PRN Moderate Pain (4 - 6)      Allergies    No Known Allergies    Intolerances        OBJECTIVE:  Vital Signs Last 24 Hrs  T(C): 36.8 (25 Sep 2023 09:06), Max: 37.2 (24 Sep 2023 17:08)  T(F): 98.2 (25 Sep 2023 09:06), Max: 99 (24 Sep 2023 17:08)  HR: 65 (25 Sep 2023 09:06) (65 - 79)  BP: 99/68 (25 Sep 2023 09:06) (97/62 - 117/77)  BP(mean): --  RR: 17 (25 Sep 2023 09:06) (17 - 18)  SpO2: 96% (25 Sep 2023 09:06) (96% - 99%)    Parameters below as of 25 Sep 2023 09:06  Patient On (Oxygen Delivery Method): room air      I&O's Summary    24 Sep 2023 07:01  -  25 Sep 2023 07:00  --------------------------------------------------------  IN: 800 mL / OUT: 4265 mL / NET: -3465 mL    25 Sep 2023 07:01  -  25 Sep 2023 13:05  --------------------------------------------------------  IN: 25 mL / OUT: 1000 mL / NET: -975 mL        PHYSICAL EXAM:  General: AOX3, in mild distress, lying in bed, speaking in full sentences, no labored breathing on RA  HEENT: AT/NC, no facial asymmetry  Lungs: no labored breathing   Heart:  Abdomen:  Extremities:      LABS:                        12.1   8.32  )-----------( 362      ( 25 Sep 2023 10:25 )             35.9     09-25    141  |  104  |  4<L>  ----------------------------<  114<H>  3.6   |  27  |  0.58    Ca    9.0      25 Sep 2023 10:25  Phos  3.7     09-25  Mg     2.1     09-25          CAPILLARY BLOOD GLUCOSE        Urinalysis Basic - ( 25 Sep 2023 10:25 )    Color: x / Appearance: x / SG: x / pH: x  Gluc: 114 mg/dL / Ketone: x  / Bili: x / Urobili: x   Blood: x / Protein: x / Nitrite: x   Leuk Esterase: x / RBC: x / WBC x   Sq Epi: x / Non Sq Epi: x / Bacteria: x        MICRODATA:      RADIOLOGY/OTHER STUDIES:

## 2023-09-25 NOTE — DIETITIAN INITIAL EVALUATION ADULT - PERTINENT LABORATORY DATA
09-25    141  |  104  |  4<L>  ----------------------------<  114<H>  3.6   |  27  |  0.58    Ca    9.0      25 Sep 2023 10:25  Phos  3.7     09-25  Mg     2.1     09-25

## 2023-09-25 NOTE — DIETITIAN INITIAL EVALUATION ADULT - OTHER CALCULATIONS
Based on Standards of Care pt >% ideal body weight, thus ideal body weight used for all calculations. Needs adjusted based on age, clinical status, postoperative demands.

## 2023-09-25 NOTE — PROGRESS NOTE ADULT - ASSESSMENT
27 y/o F with PMH of ADHD presents with abdominal pain in setting of sepsis secondary to appendicitis    Sepsis  Acute appendicitis  Cultures noted, on Pip/Tazo, WBC improving. Consider ID consult for possible PO recommendations in view of sensitivities. Drain management per surgery   Pain management per primary team, IS, OOB  Diet as tolerated     Acute blood loss anemia   Hgb downtrended from time of admission  Hemoglobin: 10.6 g/dL (09-23-23 @ 06:19)  Hemoglobin: 11.0 g/dL (09-22-23 @ 05:30)  Hemoglobin: 11.9 g/dL (09-21-23 @ 07:32)  Hemoglobin: 13.0 g/dL (09-20-23 @ 05:53)  Hemoglobin: 14.7 g/dL (09-19-23 @ 19:25)  Hemoglobin: 14.0 g/dL (09-17-23 @ 15:01)  Partially attributable to operative blood losses   continue to trend CBC     Hypomagnesemia  Resolved    DVT ppx: per primary team  Discussed with primary team

## 2023-09-25 NOTE — DIETITIAN INITIAL EVALUATION ADULT - ADD RECOMMEND
1. Continue with current diet order (low fiber diet)  >>consider implementing nourishments/nutrition supplements if pt's PO intakes are <50% consistently  2. Encourage pt to meet nutritional needs as able  3. Monitor PO intakes, trend weights (biweekly), monitor skin integrity, monitor labs (electrolytes, CMP), monitor GI function  4. Encourage adherence to diet education (reinforce as able)  5. Pain and bowel regimen per team  6. Will continue to assess/honor preferences as able   7. Align nutrition interventions with goals of care at all times

## 2023-09-26 LAB
ANION GAP SERPL CALC-SCNC: 10 MMOL/L — SIGNIFICANT CHANGE UP (ref 5–17)
BUN SERPL-MCNC: 5 MG/DL — LOW (ref 7–23)
CALCIUM SERPL-MCNC: 9 MG/DL — SIGNIFICANT CHANGE UP (ref 8.4–10.5)
CHLORIDE SERPL-SCNC: 101 MMOL/L — SIGNIFICANT CHANGE UP (ref 96–108)
CO2 SERPL-SCNC: 25 MMOL/L — SIGNIFICANT CHANGE UP (ref 22–31)
CREAT SERPL-MCNC: 0.56 MG/DL — SIGNIFICANT CHANGE UP (ref 0.5–1.3)
EGFR: 129 ML/MIN/1.73M2 — SIGNIFICANT CHANGE UP
GLUCOSE SERPL-MCNC: 95 MG/DL — SIGNIFICANT CHANGE UP (ref 70–99)
HCT VFR BLD CALC: 36.8 % — SIGNIFICANT CHANGE UP (ref 34.5–45)
HGB BLD-MCNC: 12.1 G/DL — SIGNIFICANT CHANGE UP (ref 11.5–15.5)
MAGNESIUM SERPL-MCNC: 2 MG/DL — SIGNIFICANT CHANGE UP (ref 1.6–2.6)
MCHC RBC-ENTMCNC: 31.3 PG — SIGNIFICANT CHANGE UP (ref 27–34)
MCHC RBC-ENTMCNC: 32.9 GM/DL — SIGNIFICANT CHANGE UP (ref 32–36)
MCV RBC AUTO: 95.3 FL — SIGNIFICANT CHANGE UP (ref 80–100)
NRBC # BLD: 0 /100 WBCS — SIGNIFICANT CHANGE UP (ref 0–0)
PHOSPHATE SERPL-MCNC: 4.3 MG/DL — SIGNIFICANT CHANGE UP (ref 2.5–4.5)
PLATELET # BLD AUTO: 407 K/UL — HIGH (ref 150–400)
POTASSIUM SERPL-MCNC: 4.2 MMOL/L — SIGNIFICANT CHANGE UP (ref 3.5–5.3)
POTASSIUM SERPL-SCNC: 4.2 MMOL/L — SIGNIFICANT CHANGE UP (ref 3.5–5.3)
RBC # BLD: 3.86 M/UL — SIGNIFICANT CHANGE UP (ref 3.8–5.2)
RBC # FLD: 12.4 % — SIGNIFICANT CHANGE UP (ref 10.3–14.5)
SODIUM SERPL-SCNC: 136 MMOL/L — SIGNIFICANT CHANGE UP (ref 135–145)
WBC # BLD: 11.04 K/UL — HIGH (ref 3.8–10.5)
WBC # FLD AUTO: 11.04 K/UL — HIGH (ref 3.8–10.5)

## 2023-09-26 PROCEDURE — 99232 SBSQ HOSP IP/OBS MODERATE 35: CPT

## 2023-09-26 RX ORDER — SODIUM CHLORIDE 9 MG/ML
1000 INJECTION, SOLUTION INTRAVENOUS
Refills: 0 | Status: DISCONTINUED | OUTPATIENT
Start: 2023-09-26 | End: 2023-09-27

## 2023-09-26 RX ORDER — HEPARIN SODIUM 5000 [USP'U]/ML
5000 INJECTION INTRAVENOUS; SUBCUTANEOUS ONCE
Refills: 0 | Status: COMPLETED | OUTPATIENT
Start: 2023-09-26 | End: 2023-09-26

## 2023-09-26 RX ORDER — ACETAMINOPHEN 500 MG
650 TABLET ORAL EVERY 4 HOURS
Refills: 0 | Status: DISCONTINUED | OUTPATIENT
Start: 2023-09-26 | End: 2023-10-02

## 2023-09-26 RX ADMIN — Medication 650 MILLIGRAM(S): at 11:35

## 2023-09-26 RX ADMIN — Medication 650 MILLIGRAM(S): at 17:02

## 2023-09-26 RX ADMIN — PIPERACILLIN AND TAZOBACTAM 25 GRAM(S): 4; .5 INJECTION, POWDER, LYOPHILIZED, FOR SOLUTION INTRAVENOUS at 23:04

## 2023-09-26 RX ADMIN — PIPERACILLIN AND TAZOBACTAM 25 GRAM(S): 4; .5 INJECTION, POWDER, LYOPHILIZED, FOR SOLUTION INTRAVENOUS at 15:07

## 2023-09-26 RX ADMIN — Medication 650 MILLIGRAM(S): at 02:28

## 2023-09-26 RX ADMIN — HEPARIN SODIUM 5000 UNIT(S): 5000 INJECTION INTRAVENOUS; SUBCUTANEOUS at 23:04

## 2023-09-26 RX ADMIN — Medication 650 MILLIGRAM(S): at 10:35

## 2023-09-26 RX ADMIN — HEPARIN SODIUM 5000 UNIT(S): 5000 INJECTION INTRAVENOUS; SUBCUTANEOUS at 06:35

## 2023-09-26 RX ADMIN — HEPARIN SODIUM 5000 UNIT(S): 5000 INJECTION INTRAVENOUS; SUBCUTANEOUS at 15:07

## 2023-09-26 RX ADMIN — HYDROMORPHONE HYDROCHLORIDE 0.5 MILLIGRAM(S): 2 INJECTION INTRAMUSCULAR; INTRAVENOUS; SUBCUTANEOUS at 00:15

## 2023-09-26 RX ADMIN — PIPERACILLIN AND TAZOBACTAM 25 GRAM(S): 4; .5 INJECTION, POWDER, LYOPHILIZED, FOR SOLUTION INTRAVENOUS at 06:34

## 2023-09-26 RX ADMIN — HYDROMORPHONE HYDROCHLORIDE 0.5 MILLIGRAM(S): 2 INJECTION INTRAMUSCULAR; INTRAVENOUS; SUBCUTANEOUS at 23:20

## 2023-09-26 RX ADMIN — Medication 650 MILLIGRAM(S): at 03:28

## 2023-09-26 RX ADMIN — HYDROMORPHONE HYDROCHLORIDE 0.5 MILLIGRAM(S): 2 INJECTION INTRAMUSCULAR; INTRAVENOUS; SUBCUTANEOUS at 23:04

## 2023-09-26 RX ADMIN — Medication 650 MILLIGRAM(S): at 18:22

## 2023-09-26 NOTE — PROGRESS NOTE ADULT - ATTENDING COMMENTS
Agree with above.  Patient with rise in WBC today of unclear significance.  Recommend adding a differential onto the CBC.  Check CBC with differential in AM on 9/27.  CT scan reviewed. Notable for phlegmon.  This could become an abscess.  If WBC continues to rise, it raises suspicion for this.  Continue Zosyn 4.5 grams IV q8hrs for now.

## 2023-09-26 NOTE — PROGRESS NOTE ADULT - ASSESSMENT
28 year old female with PSH of left knee ACL repair presenting with worsening right abdomen pain for 3 days. ViWBC Count: 14.88, with a neutrophilic shift.  CT Abdomen and Pelvis w/ IV Cont showed Perforated appendicitis with appendicolith and evidence of peritonitis s/p lap appendectomy, partial cecectomy and washout (9/20)    Low rest diet  Final abx plan per ID  Pain and nausea control PRN  Zosyn  CECILIO  SCDs/OOBA

## 2023-09-26 NOTE — PROGRESS NOTE ADULT - SUBJECTIVE AND OBJECTIVE BOX
INFECTIOUS DISEASES CONSULT FOLLOW-UP NOTE    INTERVAL HPI/OVERNIGHT EVENTS:  Patient is complaining of lower abdominal pain that "feels like a bad cramp". She is also complaining of not being able to hold in her urine. She denies dysuria and vaginal discharge.    ROS:   Constitutional, eyes, ENT, cardiovascular, respiratory, gastrointestinal, genitourinary, integumentary, neurological, psychiatric and heme/lymph are otherwise negative other than noted above       ANTIBIOTICS/RELEVANT:    MEDICATIONS  (STANDING):  heparin   Injectable 5000 Unit(s) SubCutaneous every 8 hours  influenza   Vaccine 0.5 milliLiter(s) IntraMuscular once  piperacillin/tazobactam IVPB.. 4.5 Gram(s) IV Intermittent every 8 hours    MEDICATIONS  (PRN):  acetaminophen     Tablet .. 650 milliGRAM(s) Oral every 6 hours PRN Mild Pain (1 - 3)  benzocaine 20% Spray 1 Spray(s) Topical three times a day PRN sore throat  HYDROmorphone  Injectable 0.5 milliGRAM(s) IV Push every 4 hours PRN Severe Pain (7 - 10)  HYDROmorphone  Injectable 0.25 milliGRAM(s) IV Push every 4 hours PRN Moderate Pain (4 - 6)        Vital Signs Last 24 Hrs  T(C): 36.8 (26 Sep 2023 09:05), Max: 37.2 (25 Sep 2023 16:56)  T(F): 98.2 (26 Sep 2023 09:05), Max: 98.9 (25 Sep 2023 16:56)  HR: 65 (26 Sep 2023 09:05) (62 - 74)  BP: 108/69 (26 Sep 2023 09:05) (99/62 - 108/69)  BP(mean): --  RR: 18 (26 Sep 2023 09:05) (16 - 18)  SpO2: 97% (26 Sep 2023 09:05) (96% - 98%)    Parameters below as of 26 Sep 2023 09:05  Patient On (Oxygen Delivery Method): room air        09-25-23 @ 07:01  -  09-26-23 @ 07:00  --------------------------------------------------------  IN: 425 mL / OUT: 4455 mL / NET: -4030 mL    09-26-23 @ 07:01  -  09-26-23 @ 12:24  --------------------------------------------------------  IN: 0 mL / OUT: 850 mL / NET: -850 mL      PHYSICAL EXAM:  Constitutional: alert, NAD  Eyes: the sclera and conjunctiva were normal.   ENT: the ears and nose were normal in appearance.   Neck: the appearance of the neck was normal and the neck was supple.   Pulmonary: no respiratory distress and lungs were clear to auscultation bilaterally.   Heart: heart rate was normal and rhythm regular, normal S1 and S2  Vascular:. there was no peripheral edema  Abdomen: normal bowel sounds, soft, tender RLQ and LLQ, no CVA tenderness  Neurological: no focal deficits.   Psychiatric: the affect was normal        LABS:                        12.1   11.04 )-----------( 407      ( 26 Sep 2023 06:37 )             36.8     09-26    136  |  101  |  5<L>  ----------------------------<  95  4.2   |  25  |  0.56    Ca    9.0      26 Sep 2023 06:37  Phos  4.3     09-26  Mg     2.0     09-26        Urinalysis Basic - ( 26 Sep 2023 06:37 )    Color: x / Appearance: x / SG: x / pH: x  Gluc: 95 mg/dL / Ketone: x  / Bili: x / Urobili: x   Blood: x / Protein: x / Nitrite: x   Leuk Esterase: x / RBC: x / WBC x   Sq Epi: x / Non Sq Epi: x / Bacteria: x        MICROBIOLOGY:      RADIOLOGY & ADDITIONAL STUDIES:  Reviewed

## 2023-09-26 NOTE — PROGRESS NOTE ADULT - SUBJECTIVE AND OBJECTIVE BOX
SUBJECTIVE:  Patient was seen and examined at bedside. Pain improved, has some cramps in the pelvis, not holding urine. Noted with increasing WBC, CT reviewed. No other complaints or events reported.    Review of systems: No fever, chills, dizziness, HA, Changes in vision, CP, dyspnea, nausea or vomiting, changes in bowel movements, LE edema. Rest of 12 point Review of systems negative unless otherwise documented elsewhere in note.     Diet, Regular:   Fiber/Residue Restricted (LOWFIBER) (09-22-23 @ 16:10) [Active]      MEDICATIONS:  MEDICATIONS  (STANDING):  heparin   Injectable 5000 Unit(s) SubCutaneous every 8 hours  influenza   Vaccine 0.5 milliLiter(s) IntraMuscular once  piperacillin/tazobactam IVPB.. 4.5 Gram(s) IV Intermittent every 8 hours    MEDICATIONS  (PRN):  acetaminophen     Tablet .. 650 milliGRAM(s) Oral every 6 hours PRN Mild Pain (1 - 3)  benzocaine 20% Spray 1 Spray(s) Topical three times a day PRN sore throat  HYDROmorphone  Injectable 0.5 milliGRAM(s) IV Push every 4 hours PRN Severe Pain (7 - 10)  HYDROmorphone  Injectable 0.25 milliGRAM(s) IV Push every 4 hours PRN Moderate Pain (4 - 6)      Allergies    No Known Allergies    Intolerances        OBJECTIVE:  Vital Signs Last 24 Hrs  T(C): 36.8 (26 Sep 2023 13:31), Max: 37.2 (25 Sep 2023 16:56)  T(F): 98.3 (26 Sep 2023 13:31), Max: 98.9 (25 Sep 2023 16:56)  HR: 68 (26 Sep 2023 13:31) (62 - 69)  BP: 108/71 (26 Sep 2023 13:31) (99/62 - 108/71)  BP(mean): --  RR: 17 (26 Sep 2023 13:31) (16 - 18)  SpO2: 97% (26 Sep 2023 13:31) (97% - 98%)    Parameters below as of 26 Sep 2023 13:31  Patient On (Oxygen Delivery Method): room air      I&O's Summary    25 Sep 2023 07:01  -  26 Sep 2023 07:00  --------------------------------------------------------  IN: 425 mL / OUT: 4455 mL / NET: -4030 mL    26 Sep 2023 07:01  -  26 Sep 2023 13:59  --------------------------------------------------------  IN: 480 mL / OUT: 1350 mL / NET: -870 mL        PHYSICAL EXAM:  General: AOX3, NAD, lying in bed, speaking in full sentences, no labored breathing on RA  HEENT: AT/NC, no facial asymmetry  Lungs: no labored breathing   Extremities no edema, no tenderness, no focal deficit     LABS:                        12.1   11.04 )-----------( 407      ( 26 Sep 2023 06:37 )             36.8     09-26    136  |  101  |  5<L>  ----------------------------<  95  4.2   |  25  |  0.56    Ca    9.0      26 Sep 2023 06:37  Phos  4.3     09-26  Mg     2.0     09-26          CAPILLARY BLOOD GLUCOSE        Urinalysis Basic - ( 26 Sep 2023 06:37 )    Color: x / Appearance: x / SG: x / pH: x  Gluc: 95 mg/dL / Ketone: x  / Bili: x / Urobili: x   Blood: x / Protein: x / Nitrite: x   Leuk Esterase: x / RBC: x / WBC x   Sq Epi: x / Non Sq Epi: x / Bacteria: x        MICRODATA:      RADIOLOGY/OTHER STUDIES:

## 2023-09-26 NOTE — PROGRESS NOTE ADULT - ASSESSMENT
27 yo female who PMH of ADHD presenting with intermittent right lower quadrant pain and lower back pain, found to have perforated appendicitis. s/p day 6 laparoscopic appendectomy, partial cecectomy and washout due to perforated appendicitis and presumed peritonitis. Culture taken from OR is positive for pseudomonas and ceftriaxone resistant E.coli. Culture shows that pseudomonas and E.coli is sensitive to zosyn. Patient's WBC count increased from 8 to 11.       Recommendations:  #Perforated Appendicitis and peritonitis  - continue zosyn 4.5 grams IV every 8 hours for total 10 day course (day 1 = 9/20, last day = 9/30)  - monitor CBC w/ differential daily considering her increasing WBC count  -recommend to delay discharge to monitor increasing WBC count    Team 1 will continue to follow.   27 yo female who PMH of ADHD presenting with intermittent right lower quadrant pain and lower back pain, found to have perforated appendicitis. s/p day 6 laparoscopic appendectomy, partial cecectomy and washout due to perforated appendicitis and presumed peritonitis. Culture taken from OR is positive for pseudomonas and ceftriaxone resistant E.coli. Culture shows that pseudomonas and E.coli is sensitive to zosyn. Patient's WBC count increased from 8 to 11.       Recommendations:  #Perforated Appendicitis and peritonitis  - continue zosyn 4.5 grams IV every 8 hours for at least a 10 day course  - monitor CBC w/ differential daily considering her increasing WBC count  -recommend to delay discharge to monitor increasing WBC count    Team 1 will continue to follow.

## 2023-09-26 NOTE — PROGRESS NOTE ADULT - ASSESSMENT
27 y/o F with PMH of ADHD presents with abdominal pain in setting of sepsis secondary to appendicitis    Sepsis  Acute appendicitis  CT reviewed, patient noted with increasing WBC this morning, afebrile, BP stable. Add differential to WBC  Continue on Pip/Tazo, course per ID/Primary team  Phlegmon aspiration per IR/Surgery  Drain management per surgery   Pain management per primary team, IS, OOB  Diet as tolerated     Acute blood loss anemia   Hgb downtrended from time of admission  Hemoglobin: 10.6 g/dL (09-23-23 @ 06:19)  Hemoglobin: 11.0 g/dL (09-22-23 @ 05:30)  Hemoglobin: 11.9 g/dL (09-21-23 @ 07:32)  Hemoglobin: 13.0 g/dL (09-20-23 @ 05:53)  Hemoglobin: 14.7 g/dL (09-19-23 @ 19:25)  Hemoglobin: 14.0 g/dL (09-17-23 @ 15:01)  Partially attributable to operative blood losses   continue to trend CBC     Thrombocytosis  Probably reactive, monitor     Hypomagnesemia  Resolved    DVT ppx: per primary team  Discussed with primary team

## 2023-09-26 NOTE — PROGRESS NOTE ADULT - SUBJECTIVE AND OBJECTIVE BOX
STATUS POST:  Laparoscopic appendectomy    Laparoscopic partial cecectomy    SUBJECTIVE: Pt seen and examined at the bedside by surgical team. Mild discomfort, no acute complaints at this time. +F/+BM, pending final abx plan with ID.    Vital Signs Last 24 Hrs  T(C): 36.8 (26 Sep 2023 04:56), Max: 37.2 (25 Sep 2023 16:56)  T(F): 98.3 (26 Sep 2023 04:56), Max: 98.9 (25 Sep 2023 16:56)  HR: 62 (26 Sep 2023 04:56) (62 - 74)  BP: 101/62 (26 Sep 2023 04:56) (99/62 - 106/71)  BP(mean): --  RR: 16 (26 Sep 2023 04:56) (16 - 17)  SpO2: 97% (26 Sep 2023 04:56) (96% - 98%)    Parameters below as of 26 Sep 2023 04:56  Patient On (Oxygen Delivery Method): room air        I&O's Summary    25 Sep 2023 07:01  -  26 Sep 2023 07:00  --------------------------------------------------------  IN: 125 mL / OUT: 4450 mL / NET: -4325 mL        Physical Exam:  General Appearance: Appears well, NAD  Pulmonary: Nonlabored breathing, no respiratory distress  Cardiovascular: NSR  Abdomen: Soft, nondisteded, appropriate incisional tenderness, dressings clean and dry and intact, CECILIO drain in place w/ no output   Extremities: WWP, SCD's in place     LABS:                        12.1   11.04 )-----------( 407      ( 26 Sep 2023 06:37 )             36.8     09-26    136  |  101  |  5<L>  ----------------------------<  95  4.2   |  25  |  0.56    Ca    9.0      26 Sep 2023 06:37  Phos  4.3     09-26  Mg     2.0     09-26        Urinalysis Basic - ( 26 Sep 2023 06:37 )    Color: x / Appearance: x / SG: x / pH: x  Gluc: 95 mg/dL / Ketone: x  / Bili: x / Urobili: x   Blood: x / Protein: x / Nitrite: x   Leuk Esterase: x / RBC: x / WBC x   Sq Epi: x / Non Sq Epi: x / Bacteria: x

## 2023-09-27 LAB
-  AZTREONAM: SIGNIFICANT CHANGE UP
-  CEFEPIME: SIGNIFICANT CHANGE UP
ANION GAP SERPL CALC-SCNC: 10 MMOL/L — SIGNIFICANT CHANGE UP (ref 5–17)
BASOPHILS # BLD AUTO: 0.07 K/UL — SIGNIFICANT CHANGE UP (ref 0–0.2)
BASOPHILS NFR BLD AUTO: 0.7 % — SIGNIFICANT CHANGE UP (ref 0–2)
BUN SERPL-MCNC: 7 MG/DL — SIGNIFICANT CHANGE UP (ref 7–23)
CALCIUM SERPL-MCNC: 9.2 MG/DL — SIGNIFICANT CHANGE UP (ref 8.4–10.5)
CHLORIDE SERPL-SCNC: 100 MMOL/L — SIGNIFICANT CHANGE UP (ref 96–108)
CO2 SERPL-SCNC: 26 MMOL/L — SIGNIFICANT CHANGE UP (ref 22–31)
CREAT SERPL-MCNC: 0.56 MG/DL — SIGNIFICANT CHANGE UP (ref 0.5–1.3)
EGFR: 129 ML/MIN/1.73M2 — SIGNIFICANT CHANGE UP
EOSINOPHIL # BLD AUTO: 0.22 K/UL — SIGNIFICANT CHANGE UP (ref 0–0.5)
EOSINOPHIL NFR BLD AUTO: 2.3 % — SIGNIFICANT CHANGE UP (ref 0–6)
GLUCOSE SERPL-MCNC: 100 MG/DL — HIGH (ref 70–99)
HCT VFR BLD CALC: 37.9 % — SIGNIFICANT CHANGE UP (ref 34.5–45)
HGB BLD-MCNC: 12.4 G/DL — SIGNIFICANT CHANGE UP (ref 11.5–15.5)
IMM GRANULOCYTES NFR BLD AUTO: 0.5 % — SIGNIFICANT CHANGE UP (ref 0–0.9)
LYMPHOCYTES # BLD AUTO: 1.75 K/UL — SIGNIFICANT CHANGE UP (ref 1–3.3)
LYMPHOCYTES # BLD AUTO: 18.4 % — SIGNIFICANT CHANGE UP (ref 13–44)
MAGNESIUM SERPL-MCNC: 2 MG/DL — SIGNIFICANT CHANGE UP (ref 1.6–2.6)
MCHC RBC-ENTMCNC: 30.6 PG — SIGNIFICANT CHANGE UP (ref 27–34)
MCHC RBC-ENTMCNC: 32.7 GM/DL — SIGNIFICANT CHANGE UP (ref 32–36)
MCV RBC AUTO: 93.6 FL — SIGNIFICANT CHANGE UP (ref 80–100)
MONOCYTES # BLD AUTO: 0.88 K/UL — SIGNIFICANT CHANGE UP (ref 0–0.9)
MONOCYTES NFR BLD AUTO: 9.2 % — SIGNIFICANT CHANGE UP (ref 2–14)
NEUTROPHILS # BLD AUTO: 6.56 K/UL — SIGNIFICANT CHANGE UP (ref 1.8–7.4)
NEUTROPHILS NFR BLD AUTO: 68.9 % — SIGNIFICANT CHANGE UP (ref 43–77)
NRBC # BLD: 0 /100 WBCS — SIGNIFICANT CHANGE UP (ref 0–0)
PHOSPHATE SERPL-MCNC: 4.4 MG/DL — SIGNIFICANT CHANGE UP (ref 2.5–4.5)
PLATELET # BLD AUTO: 486 K/UL — HIGH (ref 150–400)
POTASSIUM SERPL-MCNC: 4.2 MMOL/L — SIGNIFICANT CHANGE UP (ref 3.5–5.3)
POTASSIUM SERPL-SCNC: 4.2 MMOL/L — SIGNIFICANT CHANGE UP (ref 3.5–5.3)
RBC # BLD: 4.05 M/UL — SIGNIFICANT CHANGE UP (ref 3.8–5.2)
RBC # FLD: 12.3 % — SIGNIFICANT CHANGE UP (ref 10.3–14.5)
SODIUM SERPL-SCNC: 136 MMOL/L — SIGNIFICANT CHANGE UP (ref 135–145)
WBC # BLD: 9.53 K/UL — SIGNIFICANT CHANGE UP (ref 3.8–10.5)
WBC # FLD AUTO: 9.53 K/UL — SIGNIFICANT CHANGE UP (ref 3.8–10.5)

## 2023-09-27 PROCEDURE — 99232 SBSQ HOSP IP/OBS MODERATE 35: CPT

## 2023-09-27 RX ORDER — HEPARIN SODIUM 5000 [USP'U]/ML
5000 INJECTION INTRAVENOUS; SUBCUTANEOUS EVERY 8 HOURS
Refills: 0 | Status: DISCONTINUED | OUTPATIENT
Start: 2023-09-27 | End: 2023-10-02

## 2023-09-27 RX ORDER — OXYCODONE HYDROCHLORIDE 5 MG/1
2.5 TABLET ORAL EVERY 6 HOURS
Refills: 0 | Status: DISCONTINUED | OUTPATIENT
Start: 2023-09-27 | End: 2023-10-02

## 2023-09-27 RX ADMIN — PIPERACILLIN AND TAZOBACTAM 25 GRAM(S): 4; .5 INJECTION, POWDER, LYOPHILIZED, FOR SOLUTION INTRAVENOUS at 22:10

## 2023-09-27 RX ADMIN — SODIUM CHLORIDE 100 MILLILITER(S): 9 INJECTION, SOLUTION INTRAVENOUS at 03:29

## 2023-09-27 RX ADMIN — Medication 650 MILLIGRAM(S): at 06:32

## 2023-09-27 RX ADMIN — Medication 650 MILLIGRAM(S): at 12:30

## 2023-09-27 RX ADMIN — Medication 650 MILLIGRAM(S): at 00:30

## 2023-09-27 RX ADMIN — Medication 650 MILLIGRAM(S): at 01:30

## 2023-09-27 RX ADMIN — PIPERACILLIN AND TAZOBACTAM 25 GRAM(S): 4; .5 INJECTION, POWDER, LYOPHILIZED, FOR SOLUTION INTRAVENOUS at 14:58

## 2023-09-27 RX ADMIN — Medication 650 MILLIGRAM(S): at 11:50

## 2023-09-27 RX ADMIN — Medication 650 MILLIGRAM(S): at 19:20

## 2023-09-27 RX ADMIN — Medication 650 MILLIGRAM(S): at 23:30

## 2023-09-27 RX ADMIN — HEPARIN SODIUM 5000 UNIT(S): 5000 INJECTION INTRAVENOUS; SUBCUTANEOUS at 22:10

## 2023-09-27 RX ADMIN — PIPERACILLIN AND TAZOBACTAM 25 GRAM(S): 4; .5 INJECTION, POWDER, LYOPHILIZED, FOR SOLUTION INTRAVENOUS at 06:19

## 2023-09-27 RX ADMIN — Medication 650 MILLIGRAM(S): at 07:32

## 2023-09-27 RX ADMIN — HEPARIN SODIUM 5000 UNIT(S): 5000 INJECTION INTRAVENOUS; SUBCUTANEOUS at 06:27

## 2023-09-27 RX ADMIN — Medication 650 MILLIGRAM(S): at 18:29

## 2023-09-27 RX ADMIN — HEPARIN SODIUM 5000 UNIT(S): 5000 INJECTION INTRAVENOUS; SUBCUTANEOUS at 14:58

## 2023-09-27 NOTE — PROGRESS NOTE ADULT - ASSESSMENT
28 year old female with PSH of left knee ACL repair presenting with worsening right abdomen pain for 3 days. ViWBC Count: 14.88, with a neutrophilic shift.  CT Abdomen and Pelvis w/ IV Cont showed Perforated appendicitis with appendicolith and evidence of peritonitis s/p lap appendectomy, partial cecectomy and washout (9/20)    Low rest diet  Pain and nausea control PRN  Zosyn  HSQ/SCDs/OOBA

## 2023-09-27 NOTE — CONSULT NOTE ADULT - ASSESSMENT
Assessment: 28 year old female with PSH of left knee ACL repair presenting with worsening right abdomen pain for 3 days. ViWBC Count: 14.88, with a neutrophilic shift.  CT Abdomen and Pelvis w/ IV Cont showed Perforated appendicitis with appendicolith and evidence of peritonitis s/p lap appendectomy, partial cecectomy and washout (9/20). New CT showing developing abscess vs phlegmon. IR consulted for drainage. Case reviewed with Dr. Sun, at this time the collection at the RLQ is phlegmon and not amenable to drainage; small pelvic collection has no safe window for percutaneous drainage. No plan for IR intervention at this time. Please reconsult as needed.     Communicated with: T4 surgery

## 2023-09-27 NOTE — PROGRESS NOTE ADULT - SUBJECTIVE AND OBJECTIVE BOX
INFECTIOUS DISEASES CONSULT FOLLOW-UP NOTE    INTERVAL HPI/OVERNIGHT EVENTS:  Patient states that her lower abdominal pain is improving. She also states that her ability to control urination is also improving.    ROS:   Constitutional, eyes, ENT, cardiovascular, respiratory, gastrointestinal, genitourinary, integumentary, neurological, psychiatric and heme/lymph are otherwise negative other than noted above       ANTIBIOTICS/RELEVANT:    MEDICATIONS  (STANDING):  heparin   Injectable 5000 Unit(s) SubCutaneous every 8 hours  influenza   Vaccine 0.5 milliLiter(s) IntraMuscular once  piperacillin/tazobactam IVPB.. 4.5 Gram(s) IV Intermittent every 8 hours    MEDICATIONS  (PRN):  acetaminophen     Tablet .. 650 milliGRAM(s) Oral every 4 hours PRN Mild Pain (1 - 3)  benzocaine 20% Spray 1 Spray(s) Topical three times a day PRN sore throat  oxyCODONE    IR 2.5 milliGRAM(s) Oral every 6 hours PRN Severe Pain (7 - 10)        Vital Signs Last 24 Hrs  T(C): 36.7 (27 Sep 2023 08:30), Max: 36.8 (26 Sep 2023 13:31)  T(F): 98.1 (27 Sep 2023 08:30), Max: 98.3 (26 Sep 2023 13:31)  HR: 68 (27 Sep 2023 08:30) (63 - 68)  BP: 103/66 (27 Sep 2023 08:30) (103/66 - 122/81)  BP(mean): 95 (27 Sep 2023 05:37) (95 - 95)  RR: 18 (27 Sep 2023 08:30) (16 - 18)  SpO2: 96% (27 Sep 2023 08:30) (96% - 99%)    Parameters below as of 27 Sep 2023 08:30  Patient On (Oxygen Delivery Method): room air        09-26-23 @ 07:01  -  09-27-23 @ 07:00  --------------------------------------------------------  IN: 580 mL / OUT: 3105 mL / NET: -2525 mL    09-27-23 @ 07:01  -  09-27-23 @ 12:48  --------------------------------------------------------  IN: 340 mL / OUT: 350 mL / NET: -10 mL      PHYSICAL EXAM:  Constitutional: alert, NAD  Eyes: the sclera and conjunctiva were normal.   ENT: the ears and nose were normal in appearance.   Neck: the appearance of the neck was normal and the neck was supple.   Pulmonary: no respiratory distress and lungs were clear to auscultation bilaterally.   Heart: heart rate was normal and rhythm regular, normal S1 and S2  Vascular:. there was no peripheral edema  Abdomen: normal bowel sounds, soft, RLQ tender upon palpation  Neurological: no focal deficits.   Psychiatric: the affect was normal        LABS:                        12.4   9.53  )-----------( 486      ( 27 Sep 2023 07:28 )             37.9     09-27    136  |  100  |  7   ----------------------------<  100<H>  4.2   |  26  |  0.56    Ca    9.2      27 Sep 2023 07:28  Phos  4.4     09-27  Mg     2.0     09-27        Urinalysis Basic - ( 27 Sep 2023 07:28 )    Color: x / Appearance: x / SG: x / pH: x  Gluc: 100 mg/dL / Ketone: x  / Bili: x / Urobili: x   Blood: x / Protein: x / Nitrite: x   Leuk Esterase: x / RBC: x / WBC x   Sq Epi: x / Non Sq Epi: x / Bacteria: x        MICROBIOLOGY:      RADIOLOGY & ADDITIONAL STUDIES:  Reviewed

## 2023-09-27 NOTE — PROGRESS NOTE ADULT - ATTENDING COMMENTS
Agree with above.  Leukocytosis has resolved and patient is feeling better.  Given recent CT findings of persistent rim-enhancing peritonitis recommend a 14 day course of zosyn 4.5 grams IV q8hrs (ends 10/4/2023).  This duration can be extended based on clinical course. Ok to place PICC line. ID team 1 will follow

## 2023-09-27 NOTE — PROGRESS NOTE ADULT - SUBJECTIVE AND OBJECTIVE BOX
INTERVAL HPI/OVERNIGHT EVENTS: CAMERON. Reg diet and SQH restarted per attending    STATUS POST: 9/20: lap appendectomy, partial cecectomy and washout    SUBJECTIVE: Patient seen and examined bedside with chief resident on AM rounds. She reports mild pain that is well controlled. Tolerating diet and having bowel function. Denies cp, sob, nausea, emesis, or fevers.     MEDICATIONS  (STANDING):  heparin   Injectable 5000 Unit(s) SubCutaneous every 8 hours  influenza   Vaccine 0.5 milliLiter(s) IntraMuscular once  lactated ringers. 1000 milliLiter(s) (100 mL/Hr) IV Continuous <Continuous>  piperacillin/tazobactam IVPB.. 4.5 Gram(s) IV Intermittent every 8 hours    MEDICATIONS  (PRN):  acetaminophen     Tablet .. 650 milliGRAM(s) Oral every 4 hours PRN Mild Pain (1 - 3)  benzocaine 20% Spray 1 Spray(s) Topical three times a day PRN sore throat  HYDROmorphone  Injectable 0.5 milliGRAM(s) IV Push every 4 hours PRN Severe Pain (7 - 10)  HYDROmorphone  Injectable 0.25 milliGRAM(s) IV Push every 4 hours PRN Moderate Pain (4 - 6)      Vital Signs Last 24 Hrs  T(C): 36.7 (27 Sep 2023 05:37), Max: 36.8 (26 Sep 2023 09:05)  T(F): 98.1 (27 Sep 2023 05:37), Max: 98.3 (26 Sep 2023 13:31)  HR: 63 (27 Sep 2023 05:37) (63 - 68)  BP: 122/81 (27 Sep 2023 05:37) (107/71 - 122/81)  BP(mean): 95 (27 Sep 2023 05:37) (95 - 95)  RR: 17 (27 Sep 2023 05:37) (16 - 18)  SpO2: 99% (27 Sep 2023 05:37) (97% - 99%)    Parameters below as of 27 Sep 2023 05:37  Patient On (Oxygen Delivery Method): room air      PHYSICAL EXAM:  Constitutional: A&Ox3, resting comfortably in bed  Respiratory: non labored breathing, no respiratory distress  Cardiovascular: NSR, RRR  Gastrointestinal: Abdomen soft, appropriate incisional tenderness, non-distended. Old CECILIO site healing well, dressing changed.                 Incision: c/d/i  Genitourinary: voiding  Extremities: wwp, no calf tenderness or edema, +SCDs    I&O's Detail    26 Sep 2023 07:01  -  27 Sep 2023 07:00  --------------------------------------------------------  IN:    IV PiggyBack: 100 mL    Oral Fluid: 480 mL  Total IN: 580 mL    OUT:    Bulb (mL): 5 mL    Voided (mL): 3100 mL  Total OUT: 3105 mL    Total NET: -2525 mL          LABS:                        12.1   11.04 )-----------( 407      ( 26 Sep 2023 06:37 )             36.8     09-26    136  |  101  |  5<L>  ----------------------------<  95  4.2   |  25  |  0.56    Ca    9.0      26 Sep 2023 06:37  Phos  4.3     09-26  Mg     2.0     09-26        Urinalysis Basic - ( 26 Sep 2023 06:37 )    Color: x / Appearance: x / SG: x / pH: x  Gluc: 95 mg/dL / Ketone: x  / Bili: x / Urobili: x   Blood: x / Protein: x / Nitrite: x   Leuk Esterase: x / RBC: x / WBC x   Sq Epi: x / Non Sq Epi: x / Bacteria: x        RADIOLOGY & ADDITIONAL STUDIES:

## 2023-09-27 NOTE — CONSULT NOTE ADULT - SUBJECTIVE AND OBJECTIVE BOX
28 year old female with PSH of left knee ACL repair presenting with worsening right abdomen pain for 3 days. ViWBC Count: 14.88, with a neutrophilic shift.  CT Abdomen and Pelvis w/ IV Cont showed Perforated appendicitis with appendicolith and evidence of peritonitis s/p lap appendectomy, partial cecectomy and washout (9/20). New CT showing developing abscess vs phlegmon. IR consulted for drainage.     Clinical History: ABDOMINAL PAIN    No pertinent family history in first degree relatives    Handoff    MEWS Score    No pertinent past medical history    ADHD    No pertinent past medical history    Perforated appendix    Purulent peritonitis    Perforated appendix    Acute appendicitis    Laparoscopic appendectomy    Laparoscopic partial cecectomy    History of repair of anterior cruciate ligament of left knee    ABDOMINAL PAIN    2    [email or web address]        Meds:acetaminophen     Tablet .. 650 milliGRAM(s) Oral every 4 hours PRN  benzocaine 20% Spray 1 Spray(s) Topical three times a day PRN  heparin   Injectable 5000 Unit(s) SubCutaneous every 8 hours  influenza   Vaccine 0.5 milliLiter(s) IntraMuscular once  oxyCODONE    IR 2.5 milliGRAM(s) Oral every 6 hours PRN  piperacillin/tazobactam IVPB.. 4.5 Gram(s) IV Intermittent every 8 hours      Allergies:No Known Allergies        Labs:                           12.4   9.53  )-----------( 486      ( 27 Sep 2023 07:28 )             37.9       09-27    136  |  100  |  7   ----------------------------<  100<H>  4.2   |  26  |  0.56    Ca    9.2      27 Sep 2023 07:28  Phos  4.4     09-27  Mg     2.0     09-27            Imaging Findings: Right lower quadrant phlegmon with enhancement and irregular ill-defined fluid collection, difficult to measure, approximately 8.6 x 5.5 cm sagittal plane; small deep pelvic collection

## 2023-09-27 NOTE — PROGRESS NOTE ADULT - ASSESSMENT
29 yo female who PMH of ADHD presenting with intermittent right lower quadrant nedra, found to have perforated appendicitis. s/p day 6 laparoscopic appendectomy, partial cecectomy and washout due to perforated appendicitis and presumed peritonitis. Culture taken from OR is positive for pseudomonas and ceftriaxone resistant E.coli. Culture shows that pseudomonas and E.coli is sensitive to zosyn. Patient's WBC count decreased from 11 to 9.       Recommendations:  #Perforated Appendicitis and peritonitis  - continue zosyn 4.5 grams IV every 8 hours for a 14 day course (ending on 10/4/23)  - monitor CBC w/ differential daily   -recommend to delay discharge to monitor WBC count    Team1 will continue to follow

## 2023-09-27 NOTE — PROGRESS NOTE ADULT - SUBJECTIVE AND OBJECTIVE BOX
Feeling better.  Is able to eat, but feels full very quickly.  Denies any shortness of breath or chest pain. Has been walking around.     Remaining ROS negative     PHYSICAL EXAM:    General: no acute distress, sitting up in chair  HEENT: NC/AT; MMM  Cardiovascular: +S1/S2, RRR, no mrg  Respiratory: CTA B/L; no W/R/R  Gastrointestinal: soft, BS present in all 4 quadrants  Extremities: WWP; no edema    VITAL SIGNS:  Vital Signs Last 24 Hrs  T(C): 36.8 (27 Sep 2023 15:46), Max: 36.8 (27 Sep 2023 15:46)  T(F): 98.3 (27 Sep 2023 15:46), Max: 98.3 (27 Sep 2023 15:46)  HR: 58 (27 Sep 2023 15:46) (58 - 68)  BP: 125/79 (27 Sep 2023 15:46) (103/66 - 125/79)  BP(mean): 95 (27 Sep 2023 05:37) (95 - 95)  RR: 17 (27 Sep 2023 15:46) (16 - 18)  SpO2: 97% (27 Sep 2023 15:46) (96% - 99%)    Parameters below as of 27 Sep 2023 15:46  Patient On (Oxygen Delivery Method): room air    MEDICATIONS:  MEDICATIONS  (STANDING):  heparin   Injectable 5000 Unit(s) SubCutaneous every 8 hours  influenza   Vaccine 0.5 milliLiter(s) IntraMuscular once  piperacillin/tazobactam IVPB.. 4.5 Gram(s) IV Intermittent every 8 hours    MEDICATIONS  (PRN):  acetaminophen     Tablet .. 650 milliGRAM(s) Oral every 4 hours PRN Mild Pain (1 - 3)  benzocaine 20% Spray 1 Spray(s) Topical three times a day PRN sore throat  oxyCODONE    IR 2.5 milliGRAM(s) Oral every 6 hours PRN Severe Pain (7 - 10)      ALLERGIES:  Allergies    No Known Allergies    Intolerances        LABS:                        12.4   9.53  )-----------( 486      ( 27 Sep 2023 07:28 )             37.9     09-27    136  |  100  |  7   ----------------------------<  100<H>  4.2   |  26  |  0.56    Ca    9.2      27 Sep 2023 07:28  Phos  4.4     09-27  Mg     2.0     09-27        Urinalysis Basic - ( 27 Sep 2023 07:28 )    Color: x / Appearance: x / SG: x / pH: x  Gluc: 100 mg/dL / Ketone: x  / Bili: x / Urobili: x   Blood: x / Protein: x / Nitrite: x   Leuk Esterase: x / RBC: x / WBC x   Sq Epi: x / Non Sq Epi: x / Bacteria: x      CAPILLARY BLOOD GLUCOSE      RADIOLOGY & ADDITIONAL TESTS: Reviewed.

## 2023-09-27 NOTE — PROGRESS NOTE ADULT - ASSESSMENT
27 y/o F with PMH of ADHD presents with abdominal pain in setting of sepsis secondary to appendicitis    Sepsis  Acute appendicitis  - leukocytosis resolved  - continue with zosyn therapy for total of 14 days, per ID consult  - no IR intervention planned at this time  - drain management and pain per primary team  - encouraged patient to continue taking walks    Acute blood loss anemia   Hgb downtrended from time of admission  Hemoglobin: 10.6 g/dL (09-23-23 @ 06:19)  Hemoglobin: 11.0 g/dL (09-22-23 @ 05:30)  Hemoglobin: 11.9 g/dL (09-21-23 @ 07:32)  Hemoglobin: 13.0 g/dL (09-20-23 @ 05:53)  Hemoglobin: 14.7 g/dL (09-19-23 @ 19:25)  Hemoglobin: 14.0 g/dL (09-17-23 @ 15:01)  Partially attributable to operative blood losses 12.4 today.  No hemodynamic instability to suggest active bleeding.   continue to trend CBC     Thrombocytosis  Probably reactive, monitor     Hypomagnesemia  Resolved    DVT ppx: per primary team  Discussed with primary team    35 minutes spent on this encounter, including face to face with patient, care coordination and documentation.

## 2023-09-28 LAB
ANION GAP SERPL CALC-SCNC: 13 MMOL/L — SIGNIFICANT CHANGE UP (ref 5–17)
BUN SERPL-MCNC: 10 MG/DL — SIGNIFICANT CHANGE UP (ref 7–23)
CALCIUM SERPL-MCNC: 10 MG/DL — SIGNIFICANT CHANGE UP (ref 8.4–10.5)
CHLORIDE SERPL-SCNC: 100 MMOL/L — SIGNIFICANT CHANGE UP (ref 96–108)
CO2 SERPL-SCNC: 25 MMOL/L — SIGNIFICANT CHANGE UP (ref 22–31)
CREAT SERPL-MCNC: 0.64 MG/DL — SIGNIFICANT CHANGE UP (ref 0.5–1.3)
EGFR: 125 ML/MIN/1.73M2 — SIGNIFICANT CHANGE UP
GLUCOSE SERPL-MCNC: 96 MG/DL — SIGNIFICANT CHANGE UP (ref 70–99)
HCT VFR BLD CALC: 41.1 % — SIGNIFICANT CHANGE UP (ref 34.5–45)
HGB BLD-MCNC: 13.5 G/DL — SIGNIFICANT CHANGE UP (ref 11.5–15.5)
MAGNESIUM SERPL-MCNC: 2.1 MG/DL — SIGNIFICANT CHANGE UP (ref 1.6–2.6)
MCHC RBC-ENTMCNC: 30.7 PG — SIGNIFICANT CHANGE UP (ref 27–34)
MCHC RBC-ENTMCNC: 32.8 GM/DL — SIGNIFICANT CHANGE UP (ref 32–36)
MCV RBC AUTO: 93.4 FL — SIGNIFICANT CHANGE UP (ref 80–100)
NRBC # BLD: 0 /100 WBCS — SIGNIFICANT CHANGE UP (ref 0–0)
PHOSPHATE SERPL-MCNC: 4.6 MG/DL — HIGH (ref 2.5–4.5)
PLATELET # BLD AUTO: 563 K/UL — HIGH (ref 150–400)
POTASSIUM SERPL-MCNC: 4.6 MMOL/L — SIGNIFICANT CHANGE UP (ref 3.5–5.3)
POTASSIUM SERPL-SCNC: 4.6 MMOL/L — SIGNIFICANT CHANGE UP (ref 3.5–5.3)
RBC # BLD: 4.4 M/UL — SIGNIFICANT CHANGE UP (ref 3.8–5.2)
RBC # FLD: 12.1 % — SIGNIFICANT CHANGE UP (ref 10.3–14.5)
SODIUM SERPL-SCNC: 138 MMOL/L — SIGNIFICANT CHANGE UP (ref 135–145)
SURGICAL PATHOLOGY STUDY: SIGNIFICANT CHANGE UP
WBC # BLD: 8.82 K/UL — SIGNIFICANT CHANGE UP (ref 3.8–10.5)
WBC # FLD AUTO: 8.82 K/UL — SIGNIFICANT CHANGE UP (ref 3.8–10.5)

## 2023-09-28 PROCEDURE — 99232 SBSQ HOSP IP/OBS MODERATE 35: CPT

## 2023-09-28 PROCEDURE — 36569 INSJ PICC 5 YR+ W/O IMAGING: CPT

## 2023-09-28 PROCEDURE — 76937 US GUIDE VASCULAR ACCESS: CPT | Mod: 26,59

## 2023-09-28 RX ORDER — SODIUM CHLORIDE 9 MG/ML
10 INJECTION INTRAMUSCULAR; INTRAVENOUS; SUBCUTANEOUS
Refills: 0 | Status: DISCONTINUED | OUTPATIENT
Start: 2023-09-28 | End: 2023-10-02

## 2023-09-28 RX ORDER — CHLORHEXIDINE GLUCONATE 213 G/1000ML
1 SOLUTION TOPICAL
Refills: 0 | Status: DISCONTINUED | OUTPATIENT
Start: 2023-09-28 | End: 2023-10-01

## 2023-09-28 RX ORDER — SODIUM CHLORIDE 9 MG/ML
10 INJECTION INTRAMUSCULAR; INTRAVENOUS; SUBCUTANEOUS
Qty: 18 | Refills: 0
Start: 2023-09-28 | End: 2023-10-03

## 2023-09-28 RX ORDER — PIPERACILLIN AND TAZOBACTAM 4; .5 G/20ML; G/20ML
4.5 INJECTION, POWDER, LYOPHILIZED, FOR SOLUTION INTRAVENOUS
Qty: 18 | Refills: 0
Start: 2023-09-28 | End: 2023-10-03

## 2023-09-28 RX ADMIN — PIPERACILLIN AND TAZOBACTAM 25 GRAM(S): 4; .5 INJECTION, POWDER, LYOPHILIZED, FOR SOLUTION INTRAVENOUS at 15:18

## 2023-09-28 RX ADMIN — Medication 650 MILLIGRAM(S): at 14:27

## 2023-09-28 RX ADMIN — PIPERACILLIN AND TAZOBACTAM 25 GRAM(S): 4; .5 INJECTION, POWDER, LYOPHILIZED, FOR SOLUTION INTRAVENOUS at 06:45

## 2023-09-28 RX ADMIN — Medication 650 MILLIGRAM(S): at 13:27

## 2023-09-28 RX ADMIN — PIPERACILLIN AND TAZOBACTAM 25 GRAM(S): 4; .5 INJECTION, POWDER, LYOPHILIZED, FOR SOLUTION INTRAVENOUS at 23:39

## 2023-09-28 RX ADMIN — OXYCODONE HYDROCHLORIDE 2.5 MILLIGRAM(S): 5 TABLET ORAL at 03:34

## 2023-09-28 RX ADMIN — HEPARIN SODIUM 5000 UNIT(S): 5000 INJECTION INTRAVENOUS; SUBCUTANEOUS at 13:27

## 2023-09-28 RX ADMIN — Medication 650 MILLIGRAM(S): at 23:37

## 2023-09-28 RX ADMIN — OXYCODONE HYDROCHLORIDE 2.5 MILLIGRAM(S): 5 TABLET ORAL at 02:33

## 2023-09-28 RX ADMIN — Medication 650 MILLIGRAM(S): at 17:34

## 2023-09-28 RX ADMIN — Medication 650 MILLIGRAM(S): at 05:37

## 2023-09-28 RX ADMIN — Medication 650 MILLIGRAM(S): at 00:30

## 2023-09-28 RX ADMIN — Medication 650 MILLIGRAM(S): at 18:30

## 2023-09-28 RX ADMIN — HEPARIN SODIUM 5000 UNIT(S): 5000 INJECTION INTRAVENOUS; SUBCUTANEOUS at 06:46

## 2023-09-28 RX ADMIN — Medication 650 MILLIGRAM(S): at 22:37

## 2023-09-28 RX ADMIN — HEPARIN SODIUM 5000 UNIT(S): 5000 INJECTION INTRAVENOUS; SUBCUTANEOUS at 22:35

## 2023-09-28 RX ADMIN — Medication 650 MILLIGRAM(S): at 09:26

## 2023-09-28 RX ADMIN — Medication 650 MILLIGRAM(S): at 04:37

## 2023-09-28 NOTE — PROGRESS NOTE ADULT - SUBJECTIVE AND OBJECTIVE BOX
SUBJECTIVE:  Patient was seen and examined at bedside. Feeling clinically improved, appetite improving, denies dysuria, having BMs. No other complaints or events reported.    Review of systems: No fever, chills, dizziness, HA, Changes in vision, CP, dyspnea, nausea or vomiting, dysuria, changes in bowel movements, LE edema. Rest of 12 point Review of systems negative unless otherwise documented elsewhere in note.     Diet, Regular:   Fiber/Residue Restricted (LOWFIBER) (09-22-23 @ 16:10) [Active]      MEDICATIONS:  MEDICATIONS  (STANDING):  heparin   Injectable 5000 Unit(s) SubCutaneous every 8 hours  influenza   Vaccine 0.5 milliLiter(s) IntraMuscular once  piperacillin/tazobactam IVPB.. 4.5 Gram(s) IV Intermittent every 8 hours    MEDICATIONS  (PRN):  acetaminophen     Tablet .. 650 milliGRAM(s) Oral every 4 hours PRN Mild Pain (1 - 3)  benzocaine 20% Spray 1 Spray(s) Topical three times a day PRN sore throat  oxyCODONE    IR 2.5 milliGRAM(s) Oral every 6 hours PRN Severe Pain (7 - 10)      Allergies    No Known Allergies    Intolerances        OBJECTIVE:  Vital Signs Last 24 Hrs  T(C): 36.8 (28 Sep 2023 09:20), Max: 36.8 (27 Sep 2023 15:46)  T(F): 98.2 (28 Sep 2023 09:20), Max: 98.3 (27 Sep 2023 15:46)  HR: 60 (28 Sep 2023 09:20) (58 - 63)  BP: 108/70 (28 Sep 2023 09:20) (103/70 - 125/79)  BP(mean): --  RR: 17 (28 Sep 2023 09:20) (17 - 18)  SpO2: 96% (28 Sep 2023 09:20) (96% - 97%)    Parameters below as of 28 Sep 2023 09:20  Patient On (Oxygen Delivery Method): room air      I&O's Summary    27 Sep 2023 07:01  -  28 Sep 2023 07:00  --------------------------------------------------------  IN: 780 mL / OUT: 2500 mL / NET: -1720 mL    28 Sep 2023 07:01  -  28 Sep 2023 12:32  --------------------------------------------------------  IN: 400 mL / OUT: 450 mL / NET: -50 mL        PHYSICAL EXAM:  General: AOX3, NAD, lying in bed, speaking in full sentences, no labored breathing on RA  HEENT: AT/NC, no facial asymmetry  Lungs: no labored breathing  Extremities: no edema, no focal deficit       LABS:                        13.5   8.82  )-----------( 563      ( 28 Sep 2023 06:48 )             41.1     09-28    138  |  100  |  10  ----------------------------<  96  4.6   |  25  |  0.64    Ca    10.0      28 Sep 2023 06:48  Phos  4.6     09-28  Mg     2.1     09-28          CAPILLARY BLOOD GLUCOSE        Urinalysis Basic - ( 28 Sep 2023 06:48 )    Color: x / Appearance: x / SG: x / pH: x  Gluc: 96 mg/dL / Ketone: x  / Bili: x / Urobili: x   Blood: x / Protein: x / Nitrite: x   Leuk Esterase: x / RBC: x / WBC x   Sq Epi: x / Non Sq Epi: x / Bacteria: x        MICRODATA:      RADIOLOGY/OTHER STUDIES:

## 2023-09-28 NOTE — PROGRESS NOTE ADULT - SUBJECTIVE AND OBJECTIVE BOX
STATUS POST:  lap appendectomy, partial cecectomy and washout     SUBJECTIVE: Patient seen and examined bedside by chief resident. pain improved. feeling better. tolerating diet without nausea or vomiting     heparin   Injectable 5000 Unit(s) SubCutaneous every 8 hours  piperacillin/tazobactam IVPB.. 4.5 Gram(s) IV Intermittent every 8 hours      Vital Signs Last 24 Hrs  T(C): 36.7 (28 Sep 2023 04:34), Max: 36.8 (27 Sep 2023 15:46)  T(F): 98 (28 Sep 2023 04:34), Max: 98.3 (27 Sep 2023 15:46)  HR: 60 (28 Sep 2023 04:34) (58 - 68)  BP: 103/70 (28 Sep 2023 04:34) (103/66 - 125/79)  BP(mean): --  RR: 17 (28 Sep 2023 04:34) (17 - 18)  SpO2: 96% (28 Sep 2023 04:34) (96% - 97%)    Parameters below as of 28 Sep 2023 04:34  Patient On (Oxygen Delivery Method): room air      I&O's Detail    27 Sep 2023 07:01  -  28 Sep 2023 07:00  --------------------------------------------------------  IN:    IV PiggyBack: 100 mL    Oral Fluid: 680 mL  Total IN: 780 mL    OUT:    Voided (mL): 2500 mL  Total OUT: 2500 mL    Total NET: -1720 mL          General: NAD, resting comfortably in bed  C/V: NSR  Pulm: Nonlabored breathing, no respiratory distress  Abd: soft, NT/ND. incisions are clean, dry and intact   Extrem: WWP, no edema, SCDs in place        LABS:                        13.5   8.82  )-----------( 563      ( 28 Sep 2023 06:48 )             41.1     09-27    136  |  100  |  7   ----------------------------<  100<H>  4.2   |  26  |  0.56    Ca    9.2      27 Sep 2023 07:28  Phos  4.4     09-27  Mg     2.0     09-27        Urinalysis Basic - ( 27 Sep 2023 07:28 )    Color: x / Appearance: x / SG: x / pH: x  Gluc: 100 mg/dL / Ketone: x  / Bili: x / Urobili: x   Blood: x / Protein: x / Nitrite: x   Leuk Esterase: x / RBC: x / WBC x   Sq Epi: x / Non Sq Epi: x / Bacteria: x        RADIOLOGY & ADDITIONAL STUDIES:

## 2023-09-28 NOTE — PROGRESS NOTE ADULT - ASSESSMENT
29 y/o F with PMH of ADHD presents with abdominal pain in setting of sepsis secondary to appendicitis    Sepsis  Acute appendicitis  - leukocytosis resolved  - continue with Pip/Tazo therapy for total of 14 days, per ID consult (10/04)  - no IR intervention planned at this time  - drain management and pain per primary team     Thrombocytosis  Probably reactive, monitor     Hypomagnesemia  Resolved    DVT ppx: SQH  Discussed with primary team

## 2023-09-28 NOTE — PROGRESS NOTE ADULT - SUBJECTIVE AND OBJECTIVE BOX
INFECTIOUS DISEASES CONSULT FOLLOW-UP NOTE    INTERVAL HPI/OVERNIGHT EVENTS:  Patient has no complaints. No events.     ROS:   Constitutional, eyes, ENT, cardiovascular, respiratory, gastrointestinal, genitourinary, integumentary, neurological, psychiatric and heme/lymph are otherwise negative other than noted above       ANTIBIOTICS/RELEVANT:    MEDICATIONS  (STANDING):  heparin   Injectable 5000 Unit(s) SubCutaneous every 8 hours  influenza   Vaccine 0.5 milliLiter(s) IntraMuscular once  piperacillin/tazobactam IVPB.. 4.5 Gram(s) IV Intermittent every 8 hours    MEDICATIONS  (PRN):  acetaminophen     Tablet .. 650 milliGRAM(s) Oral every 4 hours PRN Mild Pain (1 - 3)  benzocaine 20% Spray 1 Spray(s) Topical three times a day PRN sore throat  oxyCODONE    IR 2.5 milliGRAM(s) Oral every 6 hours PRN Severe Pain (7 - 10)        Vital Signs Last 24 Hrs  T(C): 36.8 (28 Sep 2023 09:20), Max: 36.8 (27 Sep 2023 15:46)  T(F): 98.2 (28 Sep 2023 09:20), Max: 98.3 (27 Sep 2023 15:46)  HR: 60 (28 Sep 2023 09:20) (58 - 63)  BP: 108/70 (28 Sep 2023 09:20) (103/70 - 125/79)  BP(mean): --  RR: 17 (28 Sep 2023 09:20) (17 - 18)  SpO2: 96% (28 Sep 2023 09:20) (96% - 97%)    Parameters below as of 28 Sep 2023 09:20  Patient On (Oxygen Delivery Method): room air        09-27-23 @ 07:01 - 09-28-23 @ 07:00  --------------------------------------------------------  IN: 780 mL / OUT: 2500 mL / NET: -1720 mL    09-28-23 @ 07:01  -  09-28-23 @ 14:11  --------------------------------------------------------  IN: 400 mL / OUT: 450 mL / NET: -50 mL      PHYSICAL EXAM:  Constitutional: alert, NAD  Eyes: the sclera and conjunctiva were normal.   ENT: the ears and nose were normal in appearance.   Neck: the appearance of the neck was normal and the neck was supple.   Pulmonary: no respiratory distress and lungs were clear to auscultation bilaterally.   Heart: heart rate was normal and rhythm regular, normal S1 and S2  Vascular:. there was no peripheral edema  Abdomen: normal bowel sounds, soft, tender LLQ and RLQ upon palpation, ecchymosis under umbilical incision  Neurological: no focal deficits.   Psychiatric: the affect was normal        LABS:                        13.5   8.82  )-----------( 563      ( 28 Sep 2023 06:48 )             41.1     09-28    138  |  100  |  10  ----------------------------<  96  4.6   |  25  |  0.64    Ca    10.0      28 Sep 2023 06:48  Phos  4.6     09-28  Mg     2.1     09-28        Urinalysis Basic - ( 28 Sep 2023 06:48 )    Color: x / Appearance: x / SG: x / pH: x  Gluc: 96 mg/dL / Ketone: x  / Bili: x / Urobili: x   Blood: x / Protein: x / Nitrite: x   Leuk Esterase: x / RBC: x / WBC x   Sq Epi: x / Non Sq Epi: x / Bacteria: x        MICROBIOLOGY:      RADIOLOGY & ADDITIONAL STUDIES:  Reviewed

## 2023-09-28 NOTE — PROGRESS NOTE ADULT - ATTENDING COMMENTS
28F w/ ruptured appendicitis s/p OR 9/20 with finding of gangrenous perforated appendicitis and purulent peritonitis s/p lap appendectomy and partial cecectomy and washout. OR Cx with E.coli (R  but S PTZ) and PsA (R FQ). Clinically improved on zosyn. Repeat CT 9/25 with peritoneal inflammation and RLQ phlegmon not amenable to drainage.     - Continue zosyn 4.5g IV q8h EI while inpatient.  - Place midline line   - Discharge patient with zosyn 13.5g IV daily via continuous infusion  - Duration of antibiotics is tentatively 2 weeks (9/20 - 10/4)  - Weekly labs: CMP, CBC, ESR, CRP faxed to ID office at [phone]  - Patient to follow up with Dr. Barkley next week ([address] [place] [id number], [phone]), ID office will call patient to schedule. 28F w/ ruptured appendicitis s/p OR 9/20 with finding of gangrenous perforated appendicitis and purulent peritonitis s/p lap appendectomy and partial cecectomy and washout. OR Cx with E.coli (R  but S PTZ) and PsA (R FQ). Clinically improved on zosyn. Repeat CT 9/25 with peritoneal inflammation and RLQ phlegmon not amenable to drainage.     - Continue zosyn 4.5g IV q8h EI while inpatient.  - Place midline line   - Discharge patient with zosyn 13.5g IV daily via continuous infusion  - Duration of antibiotics is tentatively 2 weeks (9/20 - 10/4)  - Weekly labs: CMP, CBC, ESR, CRP faxed to ID office at [phone]  - Patient to follow up with Dr. Barkley next week ([address] [place] [address], [phone]), ID office will call patient to schedule.  - Gave strict ED precautions for fever/chills/sweats or worsening abdominal pain

## 2023-09-28 NOTE — PROGRESS NOTE ADULT - ASSESSMENT
28 year old female with PSH of left knee ACL repair presenting with worsening right abdomen pain for 3 days. ViWBC Count: 14.88, with a neutrophilic shift.  CT Abdomen and Pelvis w/ IV Cont showed Perforated appendicitis with appendicolith and evidence of peritonitis s/p lap appendectomy, partial cecectomy and washout (9/20)    Low res diet  Pain and nausea control PRN  Zosyn  HSQ/SCDs/OOBA

## 2023-09-28 NOTE — PROGRESS NOTE ADULT - ASSESSMENT
27 yo female who PMH of ADHD presenting with intermittent right lower quadrant pain, found to have perforated appendicitis. s/p day 6 laparoscopic appendectomy, partial cecectomy and washout due to perforated appendicitis and presumed peritonitis. Culture taken from OR is positive for pseudomonas and ceftriaxone resistant E.coli. Culture shows that pseudomonas and E.coli is sensitive to zosyn. Patient's WBC count is stable.      Recommendations:  #Perforated Appendicitis and peritonitis  - continue zosyn 4.5 grams IV every 8 hours for a 14 day course (ending on 10/4/23)  - monitor CBC w/ differential daily     Team1 will continue to follow     27 yo female who PMH of ADHD presenting with intermittent right lower quadrant pain, found to have perforated appendicitis. s/p day 6 laparoscopic appendectomy, partial cecectomy and washout due to perforated appendicitis and presumed peritonitis. Culture taken from OR is positive for pseudomonas and ceftriaxone resistant E.coli. Culture shows that pseudomonas and E.coli is sensitive to zosyn.       Recommendations:  #Perforated Appendicitis and peritonitis  Clinically doing well, afebrile with normal wbc. Monitoring closely due to findings of rim enhancing phlegmon on CT scan.  - continue zosyn 4.5 grams IV every 8 hours for a 14 day course (ending on 10/4/23)  - monitor CBC w/ differential daily   - should get CBC, CMP checked weekly outpatient  - ok to place PICC line    Team1 will continue to follow    Discussed with Attending, not final until signed by Attending. 29 yo female who PMH of ADHD presenting with intermittent right lower quadrant pain, found to have perforated appendicitis. s/p day 6 laparoscopic appendectomy, partial cecectomy and washout due to perforated appendicitis and presumed peritonitis. Culture taken from OR is positive for pseudomonas and ceftriaxone resistant E.coli. Culture shows that pseudomonas and E.coli is sensitive to zosyn.       Recommendations:  #Perforated Appendicitis and peritonitis  Clinically doing well, afebrile with normal wbc. Monitoring closely due to findings of rim enhancing phlegmon on CT scan.  - continue zosyn 4.5 grams IV every 8 hours for a 14 day course (ending on 10/4/23)  - monitor CBC w/ differential daily       - will follow up with Dr Barkley outpatient, should get CBC, CMP checked weekly outpatient and faxed to Dr Barkley's office  - ok to place PICC line    Team1 will continue to follow    Discussed with Attending, not final until signed by Attending. 27 yo female who PMH of ADHD presenting with intermittent right lower quadrant pain, found to have perforated appendicitis. s/p day 6 laparoscopic appendectomy, partial cecectomy and washout due to perforated appendicitis and presumed peritonitis. Culture taken from OR is positive for pseudomonas and ceftriaxone resistant E.coli. Culture shows that pseudomonas and E.coli is sensitive to zosyn.       Recommendations:  #Perforated Appendicitis and peritonitis  Clinically doing well, afebrile with normal wbc. Monitoring closely due to findings of rim enhancing phlegmon on CT scan.  - continue zosyn 4.5 grams IV every 8 hours for a 14 day course (ending on 10/4/23)  - monitor CBC w/ differential daily  - See OPAT note below    Team1 will continue to follow    Discussed with Attending, not final until signed by Attending.

## 2023-09-29 ENCOUNTER — TRANSCRIPTION ENCOUNTER (OUTPATIENT)
Age: 28
End: 2023-09-29

## 2023-09-29 LAB
ANION GAP SERPL CALC-SCNC: 12 MMOL/L — SIGNIFICANT CHANGE UP (ref 5–17)
BUN SERPL-MCNC: 13 MG/DL — SIGNIFICANT CHANGE UP (ref 7–23)
CALCIUM SERPL-MCNC: 9.8 MG/DL — SIGNIFICANT CHANGE UP (ref 8.4–10.5)
CHLORIDE SERPL-SCNC: 99 MMOL/L — SIGNIFICANT CHANGE UP (ref 96–108)
CO2 SERPL-SCNC: 26 MMOL/L — SIGNIFICANT CHANGE UP (ref 22–31)
CREAT SERPL-MCNC: 0.7 MG/DL — SIGNIFICANT CHANGE UP (ref 0.5–1.3)
EGFR: 122 ML/MIN/1.73M2 — SIGNIFICANT CHANGE UP
GLUCOSE SERPL-MCNC: 94 MG/DL — SIGNIFICANT CHANGE UP (ref 70–99)
HCT VFR BLD CALC: 39.8 % — SIGNIFICANT CHANGE UP (ref 34.5–45)
HGB BLD-MCNC: 13.2 G/DL — SIGNIFICANT CHANGE UP (ref 11.5–15.5)
MAGNESIUM SERPL-MCNC: 2.1 MG/DL — SIGNIFICANT CHANGE UP (ref 1.6–2.6)
MCHC RBC-ENTMCNC: 30.3 PG — SIGNIFICANT CHANGE UP (ref 27–34)
MCHC RBC-ENTMCNC: 33.2 GM/DL — SIGNIFICANT CHANGE UP (ref 32–36)
MCV RBC AUTO: 91.5 FL — SIGNIFICANT CHANGE UP (ref 80–100)
NRBC # BLD: 0 /100 WBCS — SIGNIFICANT CHANGE UP (ref 0–0)
PHOSPHATE SERPL-MCNC: 4.7 MG/DL — HIGH (ref 2.5–4.5)
PLATELET # BLD AUTO: 595 K/UL — HIGH (ref 150–400)
POTASSIUM SERPL-MCNC: 4.1 MMOL/L — SIGNIFICANT CHANGE UP (ref 3.5–5.3)
POTASSIUM SERPL-SCNC: 4.1 MMOL/L — SIGNIFICANT CHANGE UP (ref 3.5–5.3)
RBC # BLD: 4.35 M/UL — SIGNIFICANT CHANGE UP (ref 3.8–5.2)
RBC # FLD: 11.9 % — SIGNIFICANT CHANGE UP (ref 10.3–14.5)
SODIUM SERPL-SCNC: 137 MMOL/L — SIGNIFICANT CHANGE UP (ref 135–145)
WBC # BLD: 8.8 K/UL — SIGNIFICANT CHANGE UP (ref 3.8–10.5)
WBC # FLD AUTO: 8.8 K/UL — SIGNIFICANT CHANGE UP (ref 3.8–10.5)

## 2023-09-29 PROCEDURE — 99232 SBSQ HOSP IP/OBS MODERATE 35: CPT

## 2023-09-29 RX ADMIN — OXYCODONE HYDROCHLORIDE 2.5 MILLIGRAM(S): 5 TABLET ORAL at 03:29

## 2023-09-29 RX ADMIN — Medication 650 MILLIGRAM(S): at 11:35

## 2023-09-29 RX ADMIN — PIPERACILLIN AND TAZOBACTAM 25 GRAM(S): 4; .5 INJECTION, POWDER, LYOPHILIZED, FOR SOLUTION INTRAVENOUS at 15:39

## 2023-09-29 RX ADMIN — Medication 650 MILLIGRAM(S): at 22:39

## 2023-09-29 RX ADMIN — PIPERACILLIN AND TAZOBACTAM 25 GRAM(S): 4; .5 INJECTION, POWDER, LYOPHILIZED, FOR SOLUTION INTRAVENOUS at 23:44

## 2023-09-29 RX ADMIN — Medication 650 MILLIGRAM(S): at 06:56

## 2023-09-29 RX ADMIN — Medication 650 MILLIGRAM(S): at 18:15

## 2023-09-29 RX ADMIN — Medication 650 MILLIGRAM(S): at 07:58

## 2023-09-29 RX ADMIN — CHLORHEXIDINE GLUCONATE 1 APPLICATION(S): 213 SOLUTION TOPICAL at 06:43

## 2023-09-29 RX ADMIN — Medication 650 MILLIGRAM(S): at 23:39

## 2023-09-29 RX ADMIN — HEPARIN SODIUM 5000 UNIT(S): 5000 INJECTION INTRAVENOUS; SUBCUTANEOUS at 22:40

## 2023-09-29 RX ADMIN — Medication 650 MILLIGRAM(S): at 17:20

## 2023-09-29 RX ADMIN — Medication 650 MILLIGRAM(S): at 03:05

## 2023-09-29 RX ADMIN — HEPARIN SODIUM 5000 UNIT(S): 5000 INJECTION INTRAVENOUS; SUBCUTANEOUS at 15:39

## 2023-09-29 RX ADMIN — HEPARIN SODIUM 5000 UNIT(S): 5000 INJECTION INTRAVENOUS; SUBCUTANEOUS at 06:43

## 2023-09-29 RX ADMIN — OXYCODONE HYDROCHLORIDE 2.5 MILLIGRAM(S): 5 TABLET ORAL at 04:23

## 2023-09-29 RX ADMIN — PIPERACILLIN AND TAZOBACTAM 25 GRAM(S): 4; .5 INJECTION, POWDER, LYOPHILIZED, FOR SOLUTION INTRAVENOUS at 07:01

## 2023-09-29 RX ADMIN — Medication 650 MILLIGRAM(S): at 04:12

## 2023-09-29 NOTE — PROGRESS NOTE ADULT - ASSESSMENT
27 y/o F with PMH of ADHD presents with abdominal pain in setting of sepsis secondary to appendicitis    Sepsis  Acute appendicitis  - leukocytosis resolved  - continue with Pip/Tazo therapy for total of 14 days, per ID consult (10/04)     Thrombocytosis  Probably reactive, monitor     Hypomagnesemia  Resolved    DVT ppx: SQH  Discussed with primary team    35 minutes spent on this encounter, including face to face with patient, care coordination and documentation.

## 2023-09-29 NOTE — DISCHARGE NOTE NURSING/CASE MANAGEMENT/SOCIAL WORK - NSTRANSFERBELONGINGSRESP_GEN_A_NUR
1350: Pt arrived post colonoscopy, Settled into recliner/dressed, Denies pain/no nausea, Up to bathroom/+void  1400: Wife in Stg II, DC instructions completed with pt/wife, Dr. Ivy updated wife earlier, Verbalized understanding, Pain is minimal/no nausea  1411: IV removed  1413: Pt discharged via WC to private vehicle   yes

## 2023-09-29 NOTE — DISCHARGE NOTE NURSING/CASE MANAGEMENT/SOCIAL WORK - PATIENT PORTAL LINK FT
You can access the FollowMyHealth Patient Portal offered by [facility] by registering at the following website: [email or web address]. By joining [facility]’s FollowMyHealth portal, you will also be able to view your health information using other applications (apps) compatible with our system.

## 2023-09-29 NOTE — DISCHARGE NOTE NURSING/CASE MANAGEMENT/SOCIAL WORK - NSSCNAMETXT_GEN_ALL_CORE
Duration Of Freeze Thaw-Cycle (Seconds): 0 Detail Level: Detailed Post-Care Instructions: I reviewed with the patient in detail post-care instructions. Patient is to wear sunprotection, and avoid picking at any of the treated lesions. Pt may apply Vaseline to crusted or scabbing areas. Consent: The patient's consent was obtained including but not limited to risks of crusting, scabbing, blistering, scarring, darker or lighter pigmentary change, recurrence, incomplete removal and infection. Render Note In Bullet Format When Appropriate: No [facility]

## 2023-09-29 NOTE — PROGRESS NOTE ADULT - SUBJECTIVE AND OBJECTIVE BOX
Feeling well today, denies any worsening pain.      Remaining ROS negative     PHYSICAL EXAM:    General: no acute distress, lying in bed  HEENT: NC/AT; MMM  Cardiovascular: +S1/S2, RRR  Respiratory: CTA B/L; no W/R/R  Gastrointestinal: soft, NT/ND; +BSx4  Extremities: WWP; no edema  Psychiatric: pleasant mood and affect  Dermatologic: healing abdominal incisions     VITAL SIGNS:  Vital Signs Last 24 Hrs  T(C): 36.7 (29 Sep 2023 10:33), Max: 37 (28 Sep 2023 15:14)  T(F): 98.1 (29 Sep 2023 10:33), Max: 98.6 (28 Sep 2023 15:14)  HR: 62 (29 Sep 2023 10:33) (58 - 62)  BP: 101/62 (29 Sep 2023 10:33) (101/62 - 108/70)  BP(mean): --  RR: 16 (29 Sep 2023 10:33) (16 - 16)  SpO2: 97% (29 Sep 2023 10:33) (96% - 97%)    Parameters below as of 29 Sep 2023 10:33  Patient On (Oxygen Delivery Method): room air    MEDICATIONS:  MEDICATIONS  (STANDING):  chlorhexidine 2% Cloths 1 Application(s) Topical <User Schedule>  heparin   Injectable 5000 Unit(s) SubCutaneous every 8 hours  influenza   Vaccine 0.5 milliLiter(s) IntraMuscular once  piperacillin/tazobactam IVPB.. 4.5 Gram(s) IV Intermittent every 8 hours    MEDICATIONS  (PRN):  acetaminophen     Tablet .. 650 milliGRAM(s) Oral every 4 hours PRN Mild Pain (1 - 3)  benzocaine 20% Spray 1 Spray(s) Topical three times a day PRN sore throat  oxyCODONE    IR 2.5 milliGRAM(s) Oral every 6 hours PRN Severe Pain (7 - 10)  sodium chloride 0.9% lock flush 10 milliLiter(s) IV Push every 1 hour PRN Pre/post blood products, medications, blood draw, and to maintain line patency    ALLERGIES:  Allergies    No Known Allergies    Intolerances    LABS:                        13.2   8.80  )-----------( 595      ( 29 Sep 2023 05:30 )             39.8     09-29    137  |  99  |  13  ----------------------------<  94  4.1   |  26  |  0.70    Ca    9.8      29 Sep 2023 05:30  Phos  4.7     09-29  Mg     2.1     09-29        Urinalysis Basic - ( 29 Sep 2023 05:30 )    Color: x / Appearance: x / SG: x / pH: x  Gluc: 94 mg/dL / Ketone: x  / Bili: x / Urobili: x   Blood: x / Protein: x / Nitrite: x   Leuk Esterase: x / RBC: x / WBC x   Sq Epi: x / Non Sq Epi: x / Bacteria: x      CAPILLARY BLOOD GLUCOSE          RADIOLOGY & ADDITIONAL TESTS: Reviewed.

## 2023-09-29 NOTE — PROGRESS NOTE ADULT - SUBJECTIVE AND OBJECTIVE BOX
STATUS POST:  Laparoscopic appendectomy    Laparoscopic partial cecectomy        POST OPERATIVE DAY #: 9    SUBJECTIVE: Pt seen and examined by chief resident. Pt is doing well, resting comfortably on bed. Reporting some lower abdominal pain. No nausea or vomiting.     Vital Signs Last 24 Hrs  T(C): 36.7 (29 Sep 2023 05:00), Max: 37 (28 Sep 2023 15:14)  T(F): 98 (29 Sep 2023 05:00), Max: 98.6 (28 Sep 2023 15:14)  HR: 59 (29 Sep 2023 05:00) (58 - 61)  BP: 107/71 (29 Sep 2023 05:00) (107/70 - 108/70)  BP(mean): --  RR: 16 (29 Sep 2023 05:00) (16 - 17)  SpO2: 96% (29 Sep 2023 05:00) (96% - 97%)    Parameters below as of 29 Sep 2023 05:00  Patient On (Oxygen Delivery Method): room air        I&O's Summary    28 Sep 2023 07:01  -  29 Sep 2023 07:00  --------------------------------------------------------  IN: 1460 mL / OUT: 2950 mL / NET: -1490 mL        Physical Exam:  General Appearance: Appears well, NAD  Pulmonary: Nonlabored breathing, no respiratory distress  Abdomen: Soft, nondisteded, appropriate incisional tenderness, incisions, clean and dry and intact  Extremities: WWP, SCD's in place     LABS:                        13.5   8.82  )-----------( 563      ( 28 Sep 2023 06:48 )             41.1     09-28    138  |  100  |  10  ----------------------------<  96  4.6   |  25  |  0.64    Ca    10.0      28 Sep 2023 06:48  Phos  4.6     09-28  Mg     2.1     09-28        Urinalysis Basic - ( 28 Sep 2023 06:48 )    Color: x / Appearance: x / SG: x / pH: x  Gluc: 96 mg/dL / Ketone: x  / Bili: x / Urobili: x   Blood: x / Protein: x / Nitrite: x   Leuk Esterase: x / RBC: x / WBC x   Sq Epi: x / Non Sq Epi: x / Bacteria: x

## 2023-09-30 LAB
ANION GAP SERPL CALC-SCNC: 12 MMOL/L — SIGNIFICANT CHANGE UP (ref 5–17)
BUN SERPL-MCNC: 13 MG/DL — SIGNIFICANT CHANGE UP (ref 7–23)
CALCIUM SERPL-MCNC: 9.8 MG/DL — SIGNIFICANT CHANGE UP (ref 8.4–10.5)
CHLORIDE SERPL-SCNC: 102 MMOL/L — SIGNIFICANT CHANGE UP (ref 96–108)
CO2 SERPL-SCNC: 26 MMOL/L — SIGNIFICANT CHANGE UP (ref 22–31)
CREAT SERPL-MCNC: 0.68 MG/DL — SIGNIFICANT CHANGE UP (ref 0.5–1.3)
EGFR: 123 ML/MIN/1.73M2 — SIGNIFICANT CHANGE UP
GLUCOSE SERPL-MCNC: 99 MG/DL — SIGNIFICANT CHANGE UP (ref 70–99)
HCT VFR BLD CALC: 40.4 % — SIGNIFICANT CHANGE UP (ref 34.5–45)
HGB BLD-MCNC: 13.3 G/DL — SIGNIFICANT CHANGE UP (ref 11.5–15.5)
MAGNESIUM SERPL-MCNC: 2.1 MG/DL — SIGNIFICANT CHANGE UP (ref 1.6–2.6)
MCHC RBC-ENTMCNC: 30.4 PG — SIGNIFICANT CHANGE UP (ref 27–34)
MCHC RBC-ENTMCNC: 32.9 GM/DL — SIGNIFICANT CHANGE UP (ref 32–36)
MCV RBC AUTO: 92.4 FL — SIGNIFICANT CHANGE UP (ref 80–100)
NRBC # BLD: 0 /100 WBCS — SIGNIFICANT CHANGE UP (ref 0–0)
PHOSPHATE SERPL-MCNC: 4.4 MG/DL — SIGNIFICANT CHANGE UP (ref 2.5–4.5)
PLATELET # BLD AUTO: 579 K/UL — HIGH (ref 150–400)
POTASSIUM SERPL-MCNC: 4 MMOL/L — SIGNIFICANT CHANGE UP (ref 3.5–5.3)
POTASSIUM SERPL-SCNC: 4 MMOL/L — SIGNIFICANT CHANGE UP (ref 3.5–5.3)
RBC # BLD: 4.37 M/UL — SIGNIFICANT CHANGE UP (ref 3.8–5.2)
RBC # FLD: 11.9 % — SIGNIFICANT CHANGE UP (ref 10.3–14.5)
SODIUM SERPL-SCNC: 140 MMOL/L — SIGNIFICANT CHANGE UP (ref 135–145)
WBC # BLD: 8.75 K/UL — SIGNIFICANT CHANGE UP (ref 3.8–10.5)
WBC # FLD AUTO: 8.75 K/UL — SIGNIFICANT CHANGE UP (ref 3.8–10.5)

## 2023-09-30 PROCEDURE — 99232 SBSQ HOSP IP/OBS MODERATE 35: CPT

## 2023-09-30 RX ADMIN — Medication 650 MILLIGRAM(S): at 23:14

## 2023-09-30 RX ADMIN — Medication 650 MILLIGRAM(S): at 06:59

## 2023-09-30 RX ADMIN — Medication 650 MILLIGRAM(S): at 03:49

## 2023-09-30 RX ADMIN — Medication 650 MILLIGRAM(S): at 12:11

## 2023-09-30 RX ADMIN — PIPERACILLIN AND TAZOBACTAM 25 GRAM(S): 4; .5 INJECTION, POWDER, LYOPHILIZED, FOR SOLUTION INTRAVENOUS at 07:33

## 2023-09-30 RX ADMIN — Medication 650 MILLIGRAM(S): at 16:13

## 2023-09-30 RX ADMIN — Medication 650 MILLIGRAM(S): at 16:43

## 2023-09-30 RX ADMIN — CHLORHEXIDINE GLUCONATE 1 APPLICATION(S): 213 SOLUTION TOPICAL at 06:56

## 2023-09-30 RX ADMIN — Medication 650 MILLIGRAM(S): at 04:49

## 2023-09-30 RX ADMIN — PIPERACILLIN AND TAZOBACTAM 25 GRAM(S): 4; .5 INJECTION, POWDER, LYOPHILIZED, FOR SOLUTION INTRAVENOUS at 15:34

## 2023-09-30 RX ADMIN — Medication 650 MILLIGRAM(S): at 22:14

## 2023-09-30 RX ADMIN — Medication 650 MILLIGRAM(S): at 13:00

## 2023-09-30 RX ADMIN — Medication 650 MILLIGRAM(S): at 07:59

## 2023-09-30 RX ADMIN — HEPARIN SODIUM 5000 UNIT(S): 5000 INJECTION INTRAVENOUS; SUBCUTANEOUS at 06:56

## 2023-09-30 RX ADMIN — HEPARIN SODIUM 5000 UNIT(S): 5000 INJECTION INTRAVENOUS; SUBCUTANEOUS at 15:36

## 2023-09-30 RX ADMIN — PIPERACILLIN AND TAZOBACTAM 25 GRAM(S): 4; .5 INJECTION, POWDER, LYOPHILIZED, FOR SOLUTION INTRAVENOUS at 22:55

## 2023-09-30 RX ADMIN — HEPARIN SODIUM 5000 UNIT(S): 5000 INJECTION INTRAVENOUS; SUBCUTANEOUS at 22:55

## 2023-09-30 NOTE — PROGRESS NOTE ADULT - SUBJECTIVE AND OBJECTIVE BOX
INTERVAL HPI/OVERNIGHT EVENTS: n/-v, +F/-BM    STATUS POST: lap appendectomy, partial cecectomy and washout    POST OPERATIVE DAY #: 10    SUBJECTIVE: Patient seen and examined at bedside with chief resident. Patient states that she is feeling well and that her pain is well controlled. She denies nausea and vomiting, denies chest pain and shortness of breath. She states that she feels ready for discharge home with home infusion.      heparin   Injectable 5000 Unit(s) SubCutaneous every 8 hours  piperacillin/tazobactam IVPB.. 4.5 Gram(s) IV Intermittent every 8 hours      Vital Signs Last 24 Hrs  T(C): 36.6 (30 Sep 2023 05:27), Max: 36.9 (29 Sep 2023 16:00)  T(F): 97.8 (30 Sep 2023 05:27), Max: 98.4 (29 Sep 2023 16:00)  HR: 55 (30 Sep 2023 05:27) (54 - 65)  BP: 114/73 (30 Sep 2023 05:27) (100/63 - 114/73)  BP(mean): --  RR: 18 (30 Sep 2023 05:27) (16 - 18)  SpO2: 98% (30 Sep 2023 05:27) (96% - 98%)    Parameters below as of 30 Sep 2023 05:27  Patient On (Oxygen Delivery Method): room air      I&O's Detail    29 Sep 2023 07:01  -  30 Sep 2023 07:00  --------------------------------------------------------  IN:    Oral Fluid: 1270 mL  Total IN: 1270 mL    OUT:    Voided (mL): 2500 mL  Total OUT: 2500 mL    Total NET: -1230 mL          General: NAD, resting comfortably in bed  C/V: NSR  Pulm: Nonlabored breathing, no respiratory distress  Abd: incisions clean/dry/intact. soft, nontender, nondistended, no rebound or guarding   Extrem: WWP, no edema, SCDs in place    LABS:                        13.2   8.80  )-----------( 595      ( 29 Sep 2023 05:30 )             39.8     09-29    137  |  99  |  13  ----------------------------<  94  4.1   |  26  |  0.70    Ca    9.8      29 Sep 2023 05:30  Phos  4.7     09-29  Mg     2.1     09-29        Urinalysis Basic - ( 29 Sep 2023 05:30 )    Color: x / Appearance: x / SG: x / pH: x  Gluc: 94 mg/dL / Ketone: x  / Bili: x / Urobili: x   Blood: x / Protein: x / Nitrite: x   Leuk Esterase: x / RBC: x / WBC x   Sq Epi: x / Non Sq Epi: x / Bacteria: x        RADIOLOGY & ADDITIONAL STUDIES:

## 2023-09-30 NOTE — PROGRESS NOTE ADULT - SUBJECTIVE AND OBJECTIVE BOX
No issues overnight.  Was planned for discharge today, but some logistical issues with home infusion services are being figured out.     Remaining ROS negative       PHYSICAL EXAM:    General: resting in bed  HEENT: NC/AT; MMM  Neck: supple  Cardiovascular: +S1/S2, RRR  Respiratory: CTA B/L; no W/R/R  Gastrointestinal: soft, NT/ND; +BSx4    VITAL SIGNS:  Vital Signs Last 24 Hrs  T(C): 36.9 (30 Sep 2023 17:02), Max: 36.9 (30 Sep 2023 13:27)  T(F): 98.5 (30 Sep 2023 17:02), Max: 98.5 (30 Sep 2023 17:02)  HR: 60 (30 Sep 2023 17:02) (54 - 60)  BP: 96/59 (30 Sep 2023 17:02) (96/59 - 114/73)  BP(mean): --  RR: 18 (30 Sep 2023 17:02) (16 - 18)  SpO2: 99% (30 Sep 2023 17:02) (96% - 99%)    Parameters below as of 30 Sep 2023 17:02  Patient On (Oxygen Delivery Method): room air          MEDICATIONS:  MEDICATIONS  (STANDING):  chlorhexidine 2% Cloths 1 Application(s) Topical <User Schedule>  heparin   Injectable 5000 Unit(s) SubCutaneous every 8 hours  influenza   Vaccine 0.5 milliLiter(s) IntraMuscular once  piperacillin/tazobactam IVPB.. 4.5 Gram(s) IV Intermittent every 8 hours    MEDICATIONS  (PRN):  acetaminophen     Tablet .. 650 milliGRAM(s) Oral every 4 hours PRN Mild Pain (1 - 3)  benzocaine 20% Spray 1 Spray(s) Topical three times a day PRN sore throat  oxyCODONE    IR 2.5 milliGRAM(s) Oral every 6 hours PRN Severe Pain (7 - 10)  sodium chloride 0.9% lock flush 10 milliLiter(s) IV Push every 1 hour PRN Pre/post blood products, medications, blood draw, and to maintain line patency      ALLERGIES:  Allergies    No Known Allergies    Intolerances        LABS:                        13.3   8.75  )-----------( 579      ( 30 Sep 2023 05:30 )             40.4     09-30    140  |  102  |  13  ----------------------------<  99  4.0   |  26  |  0.68    Ca    9.8      30 Sep 2023 05:30  Phos  4.4     09-30  Mg     2.1     09-30        Urinalysis Basic - ( 30 Sep 2023 05:30 )    Color: x / Appearance: x / SG: x / pH: x  Gluc: 99 mg/dL / Ketone: x  / Bili: x / Urobili: x   Blood: x / Protein: x / Nitrite: x   Leuk Esterase: x / RBC: x / WBC x   Sq Epi: x / Non Sq Epi: x / Bacteria: x      CAPILLARY BLOOD GLUCOSE          RADIOLOGY & ADDITIONAL TESTS: Reviewed.

## 2023-10-01 PROCEDURE — 99232 SBSQ HOSP IP/OBS MODERATE 35: CPT

## 2023-10-01 RX ADMIN — Medication 650 MILLIGRAM(S): at 19:02

## 2023-10-01 RX ADMIN — HEPARIN SODIUM 5000 UNIT(S): 5000 INJECTION INTRAVENOUS; SUBCUTANEOUS at 15:42

## 2023-10-01 RX ADMIN — Medication 650 MILLIGRAM(S): at 23:01

## 2023-10-01 RX ADMIN — Medication 650 MILLIGRAM(S): at 04:32

## 2023-10-01 RX ADMIN — Medication 650 MILLIGRAM(S): at 09:29

## 2023-10-01 RX ADMIN — PIPERACILLIN AND TAZOBACTAM 25 GRAM(S): 4; .5 INJECTION, POWDER, LYOPHILIZED, FOR SOLUTION INTRAVENOUS at 07:07

## 2023-10-01 RX ADMIN — PIPERACILLIN AND TAZOBACTAM 25 GRAM(S): 4; .5 INJECTION, POWDER, LYOPHILIZED, FOR SOLUTION INTRAVENOUS at 22:58

## 2023-10-01 RX ADMIN — PIPERACILLIN AND TAZOBACTAM 25 GRAM(S): 4; .5 INJECTION, POWDER, LYOPHILIZED, FOR SOLUTION INTRAVENOUS at 15:31

## 2023-10-01 RX ADMIN — Medication 650 MILLIGRAM(S): at 15:31

## 2023-10-01 RX ADMIN — HEPARIN SODIUM 5000 UNIT(S): 5000 INJECTION INTRAVENOUS; SUBCUTANEOUS at 07:06

## 2023-10-01 RX ADMIN — HEPARIN SODIUM 5000 UNIT(S): 5000 INJECTION INTRAVENOUS; SUBCUTANEOUS at 22:57

## 2023-10-01 RX ADMIN — Medication 650 MILLIGRAM(S): at 03:32

## 2023-10-01 NOTE — PROGRESS NOTE ADULT - SUBJECTIVE AND OBJECTIVE BOX
STATUS POST:  Laparoscopic appendectomy    Laparoscopic partial cecectomy        POST OPERATIVE DAY #:     SUBJECTIVE:     Vital Signs Last 24 Hrs  T(C): 36.7 (01 Oct 2023 05:40), Max: 36.9 (30 Sep 2023 13:27)  T(F): 98.1 (01 Oct 2023 05:40), Max: 98.5 (30 Sep 2023 17:02)  HR: 59 (01 Oct 2023 05:40) (57 - 60)  BP: 99/64 (01 Oct 2023 05:40) (96/59 - 106/69)  BP(mean): 76 (01 Oct 2023 05:40) (76 - 76)  RR: 18 (01 Oct 2023 05:40) (16 - 18)  SpO2: 97% (01 Oct 2023 05:40) (96% - 99%)    Parameters below as of 01 Oct 2023 05:40  Patient On (Oxygen Delivery Method): room air        I&O's Summary    29 Sep 2023 07:01  -  30 Sep 2023 07:00  --------------------------------------------------------  IN: 1270 mL / OUT: 2500 mL / NET: -1230 mL    30 Sep 2023 07:01  -  01 Oct 2023 06:42  --------------------------------------------------------  IN: 200 mL / OUT: 700 mL / NET: -500 mL        Physical Exam:  General Appearance: Appears well, NAD  Pulmonary: Nonlabored breathing, no respiratory distress  Cardiovascular: NSR  Abdomen: Soft, nondisteded, appropriate incisional tenderness, dressings clean and dry and intact  Extremities: WWP, SCD's in place     LABS:                        13.3   8.75  )-----------( 579      ( 30 Sep 2023 05:30 )             40.4     09-30    140  |  102  |  13  ----------------------------<  99  4.0   |  26  |  0.68    Ca    9.8      30 Sep 2023 05:30  Phos  4.4     09-30  Mg     2.1     09-30        Urinalysis Basic - ( 30 Sep 2023 05:30 )    Color: x / Appearance: x / SG: x / pH: x  Gluc: 99 mg/dL / Ketone: x  / Bili: x / Urobili: x   Blood: x / Protein: x / Nitrite: x   Leuk Esterase: x / RBC: x / WBC x   Sq Epi: x / Non Sq Epi: x / Bacteria: x       STATUS POST:  Laparoscopic appendectomy    Laparoscopic partial cecectomy    SUBJECTIVE: Pt seen and examined at the bedside by surgical team. No acute complaints at this time, resting comfortably in bed, awaiting nursing infusion to be set up.     Vital Signs Last 24 Hrs  T(C): 36.7 (01 Oct 2023 05:40), Max: 36.9 (30 Sep 2023 13:27)  T(F): 98.1 (01 Oct 2023 05:40), Max: 98.5 (30 Sep 2023 17:02)  HR: 59 (01 Oct 2023 05:40) (57 - 60)  BP: 99/64 (01 Oct 2023 05:40) (96/59 - 106/69)  BP(mean): 76 (01 Oct 2023 05:40) (76 - 76)  RR: 18 (01 Oct 2023 05:40) (16 - 18)  SpO2: 97% (01 Oct 2023 05:40) (96% - 99%)    Parameters below as of 01 Oct 2023 05:40  Patient On (Oxygen Delivery Method): room air        I&O's Summary    29 Sep 2023 07:01  -  30 Sep 2023 07:00  --------------------------------------------------------  IN: 1270 mL / OUT: 2500 mL / NET: -1230 mL    30 Sep 2023 07:01  -  01 Oct 2023 06:42  --------------------------------------------------------  IN: 200 mL / OUT: 700 mL / NET: -500 mL        Physical Exam:  General Appearance: Appears well, NAD  Pulmonary: Nonlabored breathing, no respiratory distress  Cardiovascular: NSR  Abdomen: Soft, nondisteded, appropriate incisional tenderness, dressings clean and dry and intact  Extremities: WWP, SCD's in place     LABS:                        13.3   8.75  )-----------( 579      ( 30 Sep 2023 05:30 )             40.4     09-30    140  |  102  |  13  ----------------------------<  99  4.0   |  26  |  0.68    Ca    9.8      30 Sep 2023 05:30  Phos  4.4     09-30  Mg     2.1     09-30        Urinalysis Basic - ( 30 Sep 2023 05:30 )    Color: x / Appearance: x / SG: x / pH: x  Gluc: 99 mg/dL / Ketone: x  / Bili: x / Urobili: x   Blood: x / Protein: x / Nitrite: x   Leuk Esterase: x / RBC: x / WBC x   Sq Epi: x / Non Sq Epi: x / Bacteria: x

## 2023-10-01 NOTE — PROGRESS NOTE ADULT - SUBJECTIVE AND OBJECTIVE BOX
No issues, will be able to leave tomorrow.  No new symptoms.  Tolerating diet.       Remaining ROS negative       PHYSICAL EXAM:    General: resting in bed  HEENT: NC/AT; MMM  Neck: supple  Cardiovascular: +S1/S2, RRR  Respiratory: CTA B/L; no W/R/R  Gastrointestinal: soft, NT/ND; +BSx4    VITAL SIGNS:  Vital Signs Last 24 Hrs  T(C): 36.8 (01 Oct 2023 09:33), Max: 36.9 (30 Sep 2023 13:27)  T(F): 98.3 (01 Oct 2023 09:33), Max: 98.5 (30 Sep 2023 17:02)  HR: 57 (01 Oct 2023 09:33) (57 - 60)  BP: 105/67 (01 Oct 2023 09:33) (96/59 - 105/67)  BP(mean): 76 (01 Oct 2023 05:40) (76 - 76)  RR: 16 (01 Oct 2023 09:33) (16 - 18)  SpO2: 98% (01 Oct 2023 09:33) (97% - 99%)    Parameters below as of 01 Oct 2023 09:33  Patient On (Oxygen Delivery Method): room air          MEDICATIONS:  MEDICATIONS  (STANDING):  heparin   Injectable 5000 Unit(s) SubCutaneous every 8 hours  influenza   Vaccine 0.5 milliLiter(s) IntraMuscular once  piperacillin/tazobactam IVPB.. 4.5 Gram(s) IV Intermittent every 8 hours    MEDICATIONS  (PRN):  acetaminophen     Tablet .. 650 milliGRAM(s) Oral every 4 hours PRN Mild Pain (1 - 3)  oxyCODONE    IR 2.5 milliGRAM(s) Oral every 6 hours PRN Severe Pain (7 - 10)  sodium chloride 0.9% lock flush 10 milliLiter(s) IV Push every 1 hour PRN Pre/post blood products, medications, blood draw, and to maintain line patency      ALLERGIES:  Allergies    No Known Allergies    Intolerances        LABS:                        13.3   8.75  )-----------( 579      ( 30 Sep 2023 05:30 )             40.4     09-30    140  |  102  |  13  ----------------------------<  99  4.0   |  26  |  0.68    Ca    9.8      30 Sep 2023 05:30  Phos  4.4     09-30  Mg     2.1     09-30        Urinalysis Basic - ( 30 Sep 2023 05:30 )    Color: x / Appearance: x / SG: x / pH: x  Gluc: 99 mg/dL / Ketone: x  / Bili: x / Urobili: x   Blood: x / Protein: x / Nitrite: x   Leuk Esterase: x / RBC: x / WBC x   Sq Epi: x / Non Sq Epi: x / Bacteria: x      CAPILLARY BLOOD GLUCOSE          RADIOLOGY & ADDITIONAL TESTS: Reviewed.

## 2023-10-01 NOTE — PROGRESS NOTE ADULT - ASSESSMENT
28 year old female with PSH of left knee ACL repair presenting with worsening right abdomen pain for 3 days. ViWBC Count: 14.88, with a neutrophilic shift.  CT Abdomen and Pelvis w/ IV Cont showed Perforated appendicitis with appendicolith and evidence of peritonitis s/p lap appendectomy, partial cecectomy and washout (9/20), d/c tomorrow or Tuesday     Low res diet  Pain and nausea control PRN  Zosyn  HSQ/SCDs/OOBA

## 2023-10-02 ENCOUNTER — TRANSCRIPTION ENCOUNTER (OUTPATIENT)
Age: 28
End: 2023-10-02

## 2023-10-02 VITALS
SYSTOLIC BLOOD PRESSURE: 101 MMHG | DIASTOLIC BLOOD PRESSURE: 66 MMHG | TEMPERATURE: 98 F | OXYGEN SATURATION: 96 % | HEART RATE: 58 BPM | RESPIRATION RATE: 17 BRPM

## 2023-10-02 PROCEDURE — 85610 PROTHROMBIN TIME: CPT

## 2023-10-02 PROCEDURE — 96374 THER/PROPH/DIAG INJ IV PUSH: CPT

## 2023-10-02 PROCEDURE — 80053 COMPREHEN METABOLIC PANEL: CPT

## 2023-10-02 PROCEDURE — 84100 ASSAY OF PHOSPHORUS: CPT

## 2023-10-02 PROCEDURE — 87075 CULTR BACTERIA EXCEPT BLOOD: CPT

## 2023-10-02 PROCEDURE — 97161 PT EVAL LOW COMPLEX 20 MIN: CPT

## 2023-10-02 PROCEDURE — 86901 BLOOD TYPING SEROLOGIC RH(D): CPT

## 2023-10-02 PROCEDURE — 71045 X-RAY EXAM CHEST 1 VIEW: CPT

## 2023-10-02 PROCEDURE — C1889: CPT

## 2023-10-02 PROCEDURE — 85025 COMPLETE CBC W/AUTO DIFF WBC: CPT

## 2023-10-02 PROCEDURE — 84702 CHORIONIC GONADOTROPIN TEST: CPT

## 2023-10-02 PROCEDURE — 74018 RADEX ABDOMEN 1 VIEW: CPT

## 2023-10-02 PROCEDURE — 87070 CULTURE OTHR SPECIMN AEROBIC: CPT

## 2023-10-02 PROCEDURE — 83690 ASSAY OF LIPASE: CPT

## 2023-10-02 PROCEDURE — 83735 ASSAY OF MAGNESIUM: CPT

## 2023-10-02 PROCEDURE — 36410 VNPNXR 3YR/> PHY/QHP DX/THER: CPT

## 2023-10-02 PROCEDURE — 76937 US GUIDE VASCULAR ACCESS: CPT

## 2023-10-02 PROCEDURE — 96375 TX/PRO/DX INJ NEW DRUG ADDON: CPT

## 2023-10-02 PROCEDURE — 87184 SC STD DISK METHOD PER PLATE: CPT

## 2023-10-02 PROCEDURE — 99285 EMERGENCY DEPT VISIT HI MDM: CPT

## 2023-10-02 PROCEDURE — 85027 COMPLETE CBC AUTOMATED: CPT

## 2023-10-02 PROCEDURE — 97116 GAIT TRAINING THERAPY: CPT

## 2023-10-02 PROCEDURE — 87205 SMEAR GRAM STAIN: CPT

## 2023-10-02 PROCEDURE — 87186 SC STD MICRODIL/AGAR DIL: CPT

## 2023-10-02 PROCEDURE — 80048 BASIC METABOLIC PNL TOTAL CA: CPT

## 2023-10-02 PROCEDURE — C9399: CPT

## 2023-10-02 PROCEDURE — 86900 BLOOD TYPING SEROLOGIC ABO: CPT

## 2023-10-02 PROCEDURE — 74177 CT ABD & PELVIS W/CONTRAST: CPT

## 2023-10-02 PROCEDURE — 36415 COLL VENOUS BLD VENIPUNCTURE: CPT

## 2023-10-02 PROCEDURE — 86850 RBC ANTIBODY SCREEN: CPT

## 2023-10-02 PROCEDURE — 99233 SBSQ HOSP IP/OBS HIGH 50: CPT | Mod: GC

## 2023-10-02 PROCEDURE — 85730 THROMBOPLASTIN TIME PARTIAL: CPT

## 2023-10-02 PROCEDURE — 88304 TISSUE EXAM BY PATHOLOGIST: CPT

## 2023-10-02 RX ORDER — CHLORHEXIDINE GLUCONATE 213 G/1000ML
1 SOLUTION TOPICAL
Refills: 0 | Status: DISCONTINUED | OUTPATIENT
Start: 2023-10-02 | End: 2023-10-02

## 2023-10-02 RX ADMIN — Medication 650 MILLIGRAM(S): at 17:08

## 2023-10-02 RX ADMIN — Medication 650 MILLIGRAM(S): at 00:01

## 2023-10-02 RX ADMIN — HEPARIN SODIUM 5000 UNIT(S): 5000 INJECTION INTRAVENOUS; SUBCUTANEOUS at 06:56

## 2023-10-02 RX ADMIN — Medication 650 MILLIGRAM(S): at 13:00

## 2023-10-02 RX ADMIN — PIPERACILLIN AND TAZOBACTAM 25 GRAM(S): 4; .5 INJECTION, POWDER, LYOPHILIZED, FOR SOLUTION INTRAVENOUS at 13:01

## 2023-10-02 RX ADMIN — PIPERACILLIN AND TAZOBACTAM 25 GRAM(S): 4; .5 INJECTION, POWDER, LYOPHILIZED, FOR SOLUTION INTRAVENOUS at 06:56

## 2023-10-02 RX ADMIN — Medication 650 MILLIGRAM(S): at 14:00

## 2023-10-02 RX ADMIN — Medication 650 MILLIGRAM(S): at 04:38

## 2023-10-02 RX ADMIN — CHLORHEXIDINE GLUCONATE 1 APPLICATION(S): 213 SOLUTION TOPICAL at 15:54

## 2023-10-02 RX ADMIN — Medication 650 MILLIGRAM(S): at 05:38

## 2023-10-02 NOTE — DISCHARGE NOTE PROVIDER - NSDCFUADDINST_GEN_ALL_CORE_FT
-Continue eating your regular diet as tolerated and drinking plenty of fluids.   -You will continue your IV antibiotics as prescribed.  -For pain, you may take over-the-counter Tylenol and/or NSAIDs (such as Motrin/Advil) as labeled, as needed.   -No heavy lifting >20 pounds or strenuous exercise.  -You may shower but NO baths and NO swimming. Keep your incisions clean & dry. Avoid a direct stream of water over incision sites. Do not scrub. Pat dry when done.  -Contact your doctor or go to the ER for fever > 101.5, chills, nausea, vomiting, chest pain, shortness of breath, pain not controlled by medication or excessive bleeding.

## 2023-10-02 NOTE — PROGRESS NOTE ADULT - ASSESSMENT
28 year old female with PSH of left knee ACL repair presenting with worsening right abdomen pain for 3 days. ViWBC Count: 14.88, with a neutrophilic shift.  CT Abdomen and Pelvis w/ IV Cont showed Perforated appendicitis with appendicolith and evidence of peritonitis s/p lap appendectomy, partial cecectomy and washout (9/20)    Low res diet  Pain and nausea control PRN  Zosyn  HSQ/SCDs/OOBA  Dc w IV abx

## 2023-10-02 NOTE — PROGRESS NOTE ADULT - ASSESSMENT
29 y/o F with PMH of ADHD presents with abdominal pain in setting of sepsis secondary to appendicitis    Sepsis  Acute appendicitis  post op state  OOTB to chair   Encourage ambulation ; Encourage incentive spirometry  leukocytosis resolved  continue with Pip/Tazo therapy for total of 14 days, per ID consult (10/04)     Thrombocytosis  likely reactive, monitor   DT to 579    Hypomagnesemia  Resolved    DVT ppx: SQH  Discussed with primary team    35 minutes spent on this encounter, including face to face with patient, care coordination and documentation.

## 2023-10-02 NOTE — DISCHARGE NOTE PROVIDER - NSDCHHENCOUNTER_GEN_ALL_CORE
PRINCIPAL DISCHARGE DIAGNOSIS  Diagnosis: Tachycardia  Assessment and Plan of Treatment: s/p iv fluids   now resolved   evaluated by cardiology         SECONDARY DISCHARGE DIAGNOSES  Diagnosis: Chronic systolic congestive heart failure  Assessment and Plan of Treatment: Weigh yourself daily.  If you gain 3lbs in 3 days, or 5lbs in a week call your Health Care Provider.  Do not eat or drink foods containing more than 2000mg of salt (sodium) in your diet every day.  Call your Health Care Provider if you have any swelling or increased swelling in your feet, ankles, and/or stomach.  Take all of your medication as directed.  If you become dizzy call your Health Care Provider.      Diagnosis: Essential hypertension  Assessment and Plan of Treatment: Follow up with your medical doctor to establish long term blood pressure treatment goals. 02-Oct-2023

## 2023-10-02 NOTE — PROGRESS NOTE ADULT - SUBJECTIVE AND OBJECTIVE BOX
Patient is a 28y old  Female who presents with a chief complaint of Acute appendicitis (02 Oct 2023 11:33)      INTERVAL HPI/OVERNIGHT EVENTS: offers no new complaints; current symptoms resolving    MEDICATIONS  (STANDING):  heparin   Injectable 5000 Unit(s) SubCutaneous every 8 hours  influenza   Vaccine 0.5 milliLiter(s) IntraMuscular once  piperacillin/tazobactam IVPB.. 4.5 Gram(s) IV Intermittent every 8 hours    MEDICATIONS  (PRN):  acetaminophen     Tablet .. 650 milliGRAM(s) Oral every 4 hours PRN Mild Pain (1 - 3)  oxyCODONE    IR 2.5 milliGRAM(s) Oral every 6 hours PRN Severe Pain (7 - 10)  sodium chloride 0.9% lock flush 10 milliLiter(s) IV Push every 1 hour PRN Pre/post blood products, medications, blood draw, and to maintain line patency      __________________________________________________  REVIEW OF SYSTEMS:    CONSTITUTIONAL: No fever,   EYES: no acute visual disturbances  NECK: No pain or stiffness  RESPIRATORY: No cough; No shortness of breath  CARDIOVASCULAR: No chest pain, no palpitations  GASTROINTESTINAL: No pain. No nausea or vomiting; No diarrhea   NEUROLOGICAL: No headache or numbness, no tremors  MUSCULOSKELETAL: No joint pain, no muscle pain  GENITOURINARY: no dysuria, no frequency, no hesitancy  PSYCHIATRY: no depression , no anxiety  ALL OTHER  ROS negative        Vital Signs Last 24 Hrs  T(C): 36.6 (02 Oct 2023 09:06), Max: 37.1 (01 Oct 2023 14:01)  T(F): 97.9 (02 Oct 2023 09:06), Max: 98.7 (01 Oct 2023 14:01)  HR: 61 (02 Oct 2023 09:06) (54 - 64)  BP: 107/70 (02 Oct 2023 09:06) (100/62 - 107/70)  BP(mean): --  RR: 18 (02 Oct 2023 09:06) (17 - 18)  SpO2: 97% (02 Oct 2023 09:06) (96% - 97%)    Parameters below as of 02 Oct 2023 09:06  Patient On (Oxygen Delivery Method): room air        ________________________________________________  PHYSICAL EXAM:  GENERAL: NAD  HEENT: Normocephalic;  conjunctivae and sclerae clear; moist mucous membranes;   NECK : supple  CHEST/LUNG: Clear to auscultation bilaterally with good air entry   HEART: S1 S2  regular; no murmurs, gallops or rubs  ABDOMEN: Soft, Nontender, Nondistended; Bowel sounds present, incision healing well  EXTREMITIES: no cyanosis; no edema; no calf tenderness  SKIN: warm and dry; no rash  NERVOUS SYSTEM:  Awake and alert; Oriented  to place, person and time ; no new deficits    _________________________________________________  LABS:              CAPILLARY BLOOD GLUCOSE            RADIOLOGY & ADDITIONAL TESTS:      Plan of care was discussed with patient and /or primary care giver; all questions and concerns were addressed and care was aligned with patient's wishes.

## 2023-10-02 NOTE — DISCHARGE NOTE PROVIDER - NSDCFUADDAPPT_GEN_ALL_CORE_FT
Follow up w/ Dr. James in 1-2 weeks (updated info below):  [address]  [address] [place] [address]  Tel: [phone]    Please follow up with Dr. Barkley on 10/4/23. The office will call you to make an appointment.

## 2023-10-02 NOTE — PROGRESS NOTE ADULT - REASON FOR ADMISSION
Acute appendicitis

## 2023-10-02 NOTE — DISCHARGE NOTE PROVIDER - HOSPITAL COURSE
28 year old female with PSH of left knee ACL repair presenting with worsening right abdomen pain for 3 days. ViWBC Count: 14.88, with a neutrophilic shift.  CT Abdomen and Pelvis w/ IV Cont showed Perforated appendicitis with appendicolith and evidence of peritonitis s/p lap appendectomy, partial cecectomy and washout (9/20). Post op CT was done which found phlegmon which was not drainable per IR. ID was consulted for E coli and pseudomonas growth from OR cultures which require IV zosyn for 2 weeks total. Patient's post-operative course was uncomplicated. Diet was advanced as tolerated and pain was well controlled on medication. On day of discharge, pt deemed stable and ready to return home with plan to follow up as an outpatient.

## 2023-10-02 NOTE — DISCHARGE NOTE PROVIDER - NSDCMRMEDTOKEN_GEN_ALL_CORE_FT
heparin flush 10 units/mL intravenous solution: 10 unit(s) intravenously every 8 hours after every antibiotic infusion  Normal Saline Flush 0.9% injectable solution: 10 milliliter(s) injectable every 8 hours before and after antibiotic infusion  piperacillin-tazobactam 4 g-0.5 g/100 mL intravenous solution: 4.5 gram(s) intravenously every 8 hours Through 10/4/23  Vyvanse 30 mg oral tablet, chewable: 1 chewed once a day

## 2023-10-02 NOTE — PROGRESS NOTE ADULT - PROVIDER SPECIALTY LIST ADULT
Hospitalist
Infectious Disease
Internal Medicine
Surgery
Hospitalist
Internal Medicine
Internal Medicine
Surgery
Hospitalist
Infectious Disease
Internal Medicine
Surgery
Hospitalist
Infectious Disease
Surgery

## 2023-10-02 NOTE — PROGRESS NOTE ADULT - SUBJECTIVE AND OBJECTIVE BOX
SUBJECTIVE: Feeling well, patria diet, no N/V, +BF. Patient seen and examined bedside by chief resident.    heparin   Injectable 5000 Unit(s) SubCutaneous every 8 hours  piperacillin/tazobactam IVPB.. 4.5 Gram(s) IV Intermittent every 8 hours    MEDICATIONS  (PRN):  acetaminophen     Tablet .. 650 milliGRAM(s) Oral every 4 hours PRN Mild Pain (1 - 3)  oxyCODONE    IR 2.5 milliGRAM(s) Oral every 6 hours PRN Severe Pain (7 - 10)  sodium chloride 0.9% lock flush 10 milliLiter(s) IV Push every 1 hour PRN Pre/post blood products, medications, blood draw, and to maintain line patency      I&O's Detail    01 Oct 2023 07:01  -  02 Oct 2023 07:00  --------------------------------------------------------  IN:    IV PiggyBack: 100 mL    Oral Fluid: 540 mL  Total IN: 640 mL    OUT:    Voided (mL): 2850 mL  Total OUT: 2850 mL    Total NET: -2210 mL          Vital Signs Last 24 Hrs  T(C): 36.5 (02 Oct 2023 04:58), Max: 37.1 (01 Oct 2023 14:01)  T(F): 97.7 (02 Oct 2023 04:58), Max: 98.7 (01 Oct 2023 14:01)  HR: 54 (02 Oct 2023 04:58) (54 - 64)  BP: 104/63 (02 Oct 2023 04:58) (100/62 - 105/67)  BP(mean): --  RR: 17 (02 Oct 2023 04:58) (16 - 17)  SpO2: 97% (02 Oct 2023 04:58) (96% - 98%)    Parameters below as of 02 Oct 2023 04:58  Patient On (Oxygen Delivery Method): room air        General: NAD, resting comfortably in bed  C/V: NSR  Pulm: Nonlabored breathing, no respiratory distress  Abd: soft, NT/ND, incisions CDI  Extrem: SCDs in place

## 2023-10-02 NOTE — DISCHARGE NOTE PROVIDER - CARE PROVIDER_API CALL
Servando James  Colon/Rectal Surgery  [address], [address]  [place] [address]  Phone: [phone]  Fax: [phone]  Follow Up Time:     Juan Barkley  Infectious Disease  [address] [place] [address]  Phone: [phone]  Fax: [phone]  Follow Up Time:

## 2023-10-02 NOTE — DISCHARGE NOTE PROVIDER - CARE PROVIDERS DIRECT ADDRESSES
,DirectAddress_Unknown,[email or web address]

## 2023-10-03 PROBLEM — F90.9 ATTENTION-DEFICIT HYPERACTIVITY DISORDER, UNSPECIFIED TYPE: Chronic | Status: ACTIVE | Noted: 2023-09-20

## 2023-10-04 ENCOUNTER — APPOINTMENT (OUTPATIENT)
Dept: INFECTIOUS DISEASE | Facility: CLINIC | Age: 28
End: 2023-10-04
Payer: COMMERCIAL

## 2023-10-04 ENCOUNTER — TRANSCRIPTION ENCOUNTER (OUTPATIENT)
Age: 28
End: 2023-10-04

## 2023-10-04 DIAGNOSIS — K65.9 PERITONITIS, UNSPECIFIED: ICD-10-CM

## 2023-10-04 PROBLEM — Z00.00 ENCOUNTER FOR PREVENTIVE HEALTH EXAMINATION: Status: ACTIVE | Noted: 2023-10-04

## 2023-10-04 PROCEDURE — 99213 OFFICE O/P EST LOW 20 MIN: CPT | Mod: 95

## 2023-10-05 ENCOUNTER — RESULT REVIEW (OUTPATIENT)
Age: 28
End: 2023-10-05

## 2023-10-10 DIAGNOSIS — E83.42 HYPOMAGNESEMIA: ICD-10-CM

## 2023-10-10 DIAGNOSIS — F90.9 ATTENTION-DEFICIT HYPERACTIVITY DISORDER, UNSPECIFIED TYPE: ICD-10-CM

## 2023-10-10 DIAGNOSIS — B96.20 UNSPECIFIED ESCHERICHIA COLI [E. COLI] AS THE CAUSE OF DISEASES CLASSIFIED ELSEWHERE: ICD-10-CM

## 2023-10-10 DIAGNOSIS — D62 ACUTE POSTHEMORRHAGIC ANEMIA: ICD-10-CM

## 2023-10-10 DIAGNOSIS — D75.838 OTHER THROMBOCYTOSIS: ICD-10-CM

## 2023-10-10 DIAGNOSIS — K35.211 ACUTE APPENDICITIS WITH GENERALIZED PERITONITIS, WITH PERFORATION AND ABSCESS: ICD-10-CM

## 2023-10-10 DIAGNOSIS — K35.33 ACUTE APPENDICITIS WITH PERFORATION, LOCALIZED PERITONITIS, AND GANGRENE, WITH ABSCESS: ICD-10-CM

## 2023-10-10 DIAGNOSIS — B96.5 PSEUDOMONAS (AERUGINOSA) (MALLEI) (PSEUDOMALLEI) AS THE CAUSE OF DISEASES CLASSIFIED ELSEWHERE: ICD-10-CM

## 2023-10-10 DIAGNOSIS — H05.811 CYST OF RIGHT ORBIT: ICD-10-CM

## 2023-10-10 DIAGNOSIS — Z16.23 RESISTANCE TO QUINOLONES AND FLUOROQUINOLONES: ICD-10-CM

## 2023-10-10 DIAGNOSIS — A41.9 SEPSIS, UNSPECIFIED ORGANISM: ICD-10-CM

## 2023-10-10 DIAGNOSIS — K66.0 PERITONEAL ADHESIONS (POSTPROCEDURAL) (POSTINFECTION): ICD-10-CM

## 2023-11-01 ENCOUNTER — OUTPATIENT (OUTPATIENT)
Dept: OUTPATIENT SERVICES | Facility: HOSPITAL | Age: 28
LOS: 1 days | End: 2023-11-01
Payer: COMMERCIAL

## 2023-11-01 ENCOUNTER — APPOINTMENT (OUTPATIENT)
Dept: CT IMAGING | Facility: HOSPITAL | Age: 28
End: 2023-11-01

## 2023-11-01 DIAGNOSIS — Z98.890 OTHER SPECIFIED POSTPROCEDURAL STATES: Chronic | ICD-10-CM

## 2023-11-01 PROCEDURE — 74177 CT ABD & PELVIS W/CONTRAST: CPT

## 2023-11-01 PROCEDURE — 74177 CT ABD & PELVIS W/CONTRAST: CPT | Mod: 26

## 2023-11-02 ENCOUNTER — NON-APPOINTMENT (OUTPATIENT)
Age: 28
End: 2023-11-02

## 2024-02-02 ENCOUNTER — EMERGENCY (EMERGENCY)
Facility: HOSPITAL | Age: 29
LOS: 1 days | Discharge: HOME | End: 2024-02-02
Attending: EMERGENCY MEDICINE | Admitting: EMERGENCY MEDICINE
Payer: COMMERCIAL

## 2024-02-02 VITALS
WEIGHT: 126.32 LBS | TEMPERATURE: 98 F | DIASTOLIC BLOOD PRESSURE: 83 MMHG | SYSTOLIC BLOOD PRESSURE: 114 MMHG | OXYGEN SATURATION: 100 % | RESPIRATION RATE: 18 BRPM | HEART RATE: 63 BPM

## 2024-02-02 VITALS
TEMPERATURE: 99 F | DIASTOLIC BLOOD PRESSURE: 79 MMHG | OXYGEN SATURATION: 100 % | SYSTOLIC BLOOD PRESSURE: 117 MMHG | RESPIRATION RATE: 18 BRPM | HEART RATE: 62 BPM

## 2024-02-02 DIAGNOSIS — R11.0 NAUSEA: ICD-10-CM

## 2024-02-02 DIAGNOSIS — R68.81 EARLY SATIETY: ICD-10-CM

## 2024-02-02 DIAGNOSIS — F90.9 ATTENTION-DEFICIT HYPERACTIVITY DISORDER, UNSPECIFIED TYPE: ICD-10-CM

## 2024-02-02 DIAGNOSIS — R14.0 ABDOMINAL DISTENSION (GASEOUS): ICD-10-CM

## 2024-02-02 DIAGNOSIS — Z90.49 ACQUIRED ABSENCE OF OTHER SPECIFIED PARTS OF DIGESTIVE TRACT: ICD-10-CM

## 2024-02-02 DIAGNOSIS — K59.00 CONSTIPATION, UNSPECIFIED: ICD-10-CM

## 2024-02-02 DIAGNOSIS — R14.3 FLATULENCE: ICD-10-CM

## 2024-02-02 DIAGNOSIS — F98.8 OTHER SPECIFIED BEHAVIORAL AND EMOTIONAL DISORDERS WITH ONSET USUALLY OCCURRING IN CHILDHOOD AND ADOLESCENCE: ICD-10-CM

## 2024-02-02 DIAGNOSIS — Z98.890 OTHER SPECIFIED POSTPROCEDURAL STATES: Chronic | ICD-10-CM

## 2024-02-02 DIAGNOSIS — Z90.49 ACQUIRED ABSENCE OF OTHER SPECIFIED PARTS OF DIGESTIVE TRACT: Chronic | ICD-10-CM

## 2024-02-02 DIAGNOSIS — R15.2 FECAL URGENCY: ICD-10-CM

## 2024-02-02 DIAGNOSIS — T50.996A UNDERDOSING OF OTHER DRUGS, MEDICAMENTS AND BIOLOGICAL SUBSTANCES, INITIAL ENCOUNTER: ICD-10-CM

## 2024-02-02 DIAGNOSIS — R10.9 UNSPECIFIED ABDOMINAL PAIN: ICD-10-CM

## 2024-02-02 DIAGNOSIS — Z91.148 PATIENT'S OTHER NONCOMPLIANCE WITH MEDICATION REGIMEN FOR OTHER REASON: ICD-10-CM

## 2024-02-02 LAB
ALBUMIN SERPL ELPH-MCNC: 4.3 G/DL — SIGNIFICANT CHANGE UP (ref 3.3–5)
ALP SERPL-CCNC: 51 U/L — SIGNIFICANT CHANGE UP (ref 40–120)
ALT FLD-CCNC: 9 U/L — LOW (ref 10–45)
ANION GAP SERPL CALC-SCNC: 10 MMOL/L — SIGNIFICANT CHANGE UP (ref 5–17)
APPEARANCE UR: CLEAR — SIGNIFICANT CHANGE UP
AST SERPL-CCNC: 15 U/L — SIGNIFICANT CHANGE UP (ref 10–40)
BACTERIA # UR AUTO: ABNORMAL /HPF
BASOPHILS # BLD AUTO: 0.02 K/UL — SIGNIFICANT CHANGE UP (ref 0–0.2)
BASOPHILS NFR BLD AUTO: 0.3 % — SIGNIFICANT CHANGE UP (ref 0–2)
BILIRUB SERPL-MCNC: <0.2 MG/DL — SIGNIFICANT CHANGE UP (ref 0.2–1.2)
BILIRUB UR-MCNC: NEGATIVE — SIGNIFICANT CHANGE UP
BUN SERPL-MCNC: 8 MG/DL — SIGNIFICANT CHANGE UP (ref 7–23)
CALCIUM SERPL-MCNC: 9.3 MG/DL — SIGNIFICANT CHANGE UP (ref 8.4–10.5)
CAST: 1 /LPF — SIGNIFICANT CHANGE UP (ref 0–4)
CHLORIDE SERPL-SCNC: 106 MMOL/L — SIGNIFICANT CHANGE UP (ref 96–108)
CO2 SERPL-SCNC: 27 MMOL/L — SIGNIFICANT CHANGE UP (ref 22–31)
COLOR SPEC: YELLOW — SIGNIFICANT CHANGE UP
CREAT SERPL-MCNC: 0.68 MG/DL — SIGNIFICANT CHANGE UP (ref 0.5–1.3)
DIFF PNL FLD: NEGATIVE — SIGNIFICANT CHANGE UP
EGFR: 123 ML/MIN/1.73M2 — SIGNIFICANT CHANGE UP
EOSINOPHIL # BLD AUTO: 0.1 K/UL — SIGNIFICANT CHANGE UP (ref 0–0.5)
EOSINOPHIL NFR BLD AUTO: 1.4 % — SIGNIFICANT CHANGE UP (ref 0–6)
GLUCOSE SERPL-MCNC: 89 MG/DL — SIGNIFICANT CHANGE UP (ref 70–99)
GLUCOSE UR QL: NEGATIVE MG/DL — SIGNIFICANT CHANGE UP
HCT VFR BLD CALC: 39.4 % — SIGNIFICANT CHANGE UP (ref 34.5–45)
HGB BLD-MCNC: 13 G/DL — SIGNIFICANT CHANGE UP (ref 11.5–15.5)
IMM GRANULOCYTES NFR BLD AUTO: 0.4 % — SIGNIFICANT CHANGE UP (ref 0–0.9)
KETONES UR-MCNC: NEGATIVE MG/DL — SIGNIFICANT CHANGE UP
LACTATE SERPL-SCNC: 1 MMOL/L — SIGNIFICANT CHANGE UP (ref 0.5–2)
LEUKOCYTE ESTERASE UR-ACNC: ABNORMAL
LIDOCAIN IGE QN: 57 U/L — SIGNIFICANT CHANGE UP (ref 7–60)
LYMPHOCYTES # BLD AUTO: 1.46 K/UL — SIGNIFICANT CHANGE UP (ref 1–3.3)
LYMPHOCYTES # BLD AUTO: 20.9 % — SIGNIFICANT CHANGE UP (ref 13–44)
MCHC RBC-ENTMCNC: 30.8 PG — SIGNIFICANT CHANGE UP (ref 27–34)
MCHC RBC-ENTMCNC: 33 GM/DL — SIGNIFICANT CHANGE UP (ref 32–36)
MCV RBC AUTO: 93.4 FL — SIGNIFICANT CHANGE UP (ref 80–100)
MONOCYTES # BLD AUTO: 0.47 K/UL — SIGNIFICANT CHANGE UP (ref 0–0.9)
MONOCYTES NFR BLD AUTO: 6.7 % — SIGNIFICANT CHANGE UP (ref 2–14)
NEUTROPHILS # BLD AUTO: 4.92 K/UL — SIGNIFICANT CHANGE UP (ref 1.8–7.4)
NEUTROPHILS NFR BLD AUTO: 70.3 % — SIGNIFICANT CHANGE UP (ref 43–77)
NITRITE UR-MCNC: NEGATIVE — SIGNIFICANT CHANGE UP
NRBC # BLD: 0 /100 WBCS — SIGNIFICANT CHANGE UP (ref 0–0)
PH UR: 6 — SIGNIFICANT CHANGE UP (ref 5–8)
PLATELET # BLD AUTO: 245 K/UL — SIGNIFICANT CHANGE UP (ref 150–400)
POTASSIUM SERPL-MCNC: 4.2 MMOL/L — SIGNIFICANT CHANGE UP (ref 3.5–5.3)
POTASSIUM SERPL-SCNC: 4.2 MMOL/L — SIGNIFICANT CHANGE UP (ref 3.5–5.3)
PROT SERPL-MCNC: 6.9 G/DL — SIGNIFICANT CHANGE UP (ref 6–8.3)
PROT UR-MCNC: NEGATIVE MG/DL — SIGNIFICANT CHANGE UP
RBC # BLD: 4.22 M/UL — SIGNIFICANT CHANGE UP (ref 3.8–5.2)
RBC # FLD: 12.6 % — SIGNIFICANT CHANGE UP (ref 10.3–14.5)
RBC CASTS # UR COMP ASSIST: 0 /HPF — SIGNIFICANT CHANGE UP (ref 0–4)
SODIUM SERPL-SCNC: 143 MMOL/L — SIGNIFICANT CHANGE UP (ref 135–145)
SP GR SPEC: 1.01 — SIGNIFICANT CHANGE UP (ref 1–1.03)
SQUAMOUS # UR AUTO: 13 /HPF — HIGH (ref 0–5)
UROBILINOGEN FLD QL: 0.2 MG/DL — SIGNIFICANT CHANGE UP (ref 0.2–1)
WBC # BLD: 7 K/UL — SIGNIFICANT CHANGE UP (ref 3.8–10.5)
WBC # FLD AUTO: 7 K/UL — SIGNIFICANT CHANGE UP (ref 3.8–10.5)
WBC UR QL: 3 /HPF — SIGNIFICANT CHANGE UP (ref 0–5)

## 2024-02-02 PROCEDURE — 74177 CT ABD & PELVIS W/CONTRAST: CPT | Mod: ME

## 2024-02-02 PROCEDURE — 81001 URINALYSIS AUTO W/SCOPE: CPT

## 2024-02-02 PROCEDURE — 96375 TX/PRO/DX INJ NEW DRUG ADDON: CPT

## 2024-02-02 PROCEDURE — 83605 ASSAY OF LACTIC ACID: CPT

## 2024-02-02 PROCEDURE — 83690 ASSAY OF LIPASE: CPT

## 2024-02-02 PROCEDURE — G1004: CPT

## 2024-02-02 PROCEDURE — 96374 THER/PROPH/DIAG INJ IV PUSH: CPT | Mod: XU

## 2024-02-02 PROCEDURE — 85025 COMPLETE CBC W/AUTO DIFF WBC: CPT

## 2024-02-02 PROCEDURE — 36415 COLL VENOUS BLD VENIPUNCTURE: CPT

## 2024-02-02 PROCEDURE — 99284 EMERGENCY DEPT VISIT MOD MDM: CPT | Mod: 25

## 2024-02-02 PROCEDURE — 99285 EMERGENCY DEPT VISIT HI MDM: CPT

## 2024-02-02 PROCEDURE — 74177 CT ABD & PELVIS W/CONTRAST: CPT | Mod: 26,ME

## 2024-02-02 PROCEDURE — 80053 COMPREHEN METABOLIC PANEL: CPT

## 2024-02-02 RX ORDER — IOHEXOL 300 MG/ML
30 INJECTION, SOLUTION INTRAVENOUS ONCE
Refills: 0 | Status: COMPLETED | OUTPATIENT
Start: 2024-02-02 | End: 2024-02-02

## 2024-02-02 RX ORDER — POLYETHYLENE GLYCOL 3350 17 G/17G
17 POWDER, FOR SOLUTION ORAL
Qty: 1 | Refills: 0
Start: 2024-02-02 | End: 2024-02-08

## 2024-02-02 RX ORDER — ACETAMINOPHEN 500 MG
1000 TABLET ORAL ONCE
Refills: 0 | Status: COMPLETED | OUTPATIENT
Start: 2024-02-02 | End: 2024-02-02

## 2024-02-02 RX ORDER — ONDANSETRON 8 MG/1
4 TABLET, FILM COATED ORAL ONCE
Refills: 0 | Status: COMPLETED | OUTPATIENT
Start: 2024-02-02 | End: 2024-02-02

## 2024-02-02 RX ORDER — SODIUM CHLORIDE 9 MG/ML
1000 INJECTION, SOLUTION INTRAVENOUS
Refills: 0 | Status: DISCONTINUED | OUTPATIENT
Start: 2024-02-02 | End: 2024-02-05

## 2024-02-02 RX ADMIN — IOHEXOL 30 MILLILITER(S): 300 INJECTION, SOLUTION INTRAVENOUS at 09:33

## 2024-02-02 RX ADMIN — ONDANSETRON 4 MILLIGRAM(S): 8 TABLET, FILM COATED ORAL at 09:33

## 2024-02-02 RX ADMIN — SODIUM CHLORIDE 150 MILLILITER(S): 9 INJECTION, SOLUTION INTRAVENOUS at 09:33

## 2024-02-02 RX ADMIN — Medication 400 MILLIGRAM(S): at 09:43

## 2024-02-02 NOTE — ED PROVIDER NOTE - NSICDXPASTSURGICALHX_GEN_ALL_CORE_FT
PAST SURGICAL HISTORY:  History of appendectomy     History of repair of anterior cruciate ligament of left knee

## 2024-02-02 NOTE — CONSULT NOTE ADULT - SUBJECTIVE AND OBJECTIVE BOX
24yo F w/ PMH ADHD and PSH laparosccopic appendectomy and partial cecectomy and washout (9/20/23 w/ Dr. James) who presenting w/ 4mo hx of early satiety and fecal urgency. Pt states that since her operation in September she feel as if she can no longer eat regular portions of food, reports frequent small meals. Additionally since the surgery she has had the sensation of fecal urgency in which she describes rushing to the bathroom when she feels the sensation of needing to defecate w/ fear of not making it to the bathroom. Reports feeling bloated/distended as well describes symptoms of epigastric gurgling and reflux. Of note, pt has seen a gastroenterologist and is currently being worked up for SIBO.     Last colonoscopy/EGD - never  Denies family hx of IBS, Crohn's, UC, or colon cancer.    In the ED, pt afebrile, nontachycardic, normotensive. On exam, abdomen is soft, mildly distended, nontender. Labs show WBC 7, chemistry unremarkable. CT AP with PO and IV contrast demonstrates large stool burden in colon.    PMHx: ADHD  PSx: laparoscopic appendectomy and partial cecectomy (9/20/23)  Social Hx: non-smoker  Family Hx: none    Meds: Vivance    T(C): 36.7 (02-02-24 @ 08:44), Max: 36.7 (02-02-24 @ 08:44)  HR: 63 (02-02-24 @ 08:44) (63 - 63)  BP: 114/83 (02-02-24 @ 08:44) (114/83 - 114/83)  RR: 18 (02-02-24 @ 08:44) (18 - 18)  SpO2: 100% (02-02-24 @ 08:44) (100% - 100%)    Physical Exam  General: AAOx3, NAD, laying comfortably in bed  Cardio: S1,S2, No MRG  Pulm: Nonlabored breathing  Abdomen: soft, mildly distended, nontender  Extremities: WWP, peripheral pulses appreciated    LABS:                        13.0   7.00  )-----------( 245      ( 02 Feb 2024 09:07 )             39.4     02-02    143  |  106  |  8   ----------------------------<  89  4.2   |  27  |  0.68    Ca    9.3      02 Feb 2024 09:07    TPro  6.9  /  Alb  4.3  /  TBili  <0.2  /  DBili  x   /  AST  15  /  ALT  9<L>  /  AlkPhos  51  02-02      ACC: [id number] EXAM:  CT ABDOMEN AND PELVIS OC IC   ORDERED BY: SKYLAR MORENO     PROCEDURE DATE:  02/02/2024          INTERPRETATION:  CLINICAL INFORMATION: Abdominal pain. Postop.    COMPARISON: None.    CONTRAST/COMPLICATIONS:  IV Contrast: 90 cc  Omnipaque 370   10 cc discarded  Oral Contrast: Gastroview  Complications: None reported    PROCEDURE:  CT of the Abdomen and Pelvis was performed.  Sagittal and coronal reformats were performed.    FINDINGS:  LOWER CHEST: Within normal limits.    LIVER: Within normal limits.  BILE DUCTS: Normal caliber.  GALLBLADDER: Within normal limits.  SPLEEN: Within normal limits.  PANCREAS: Within normal limits.  ADRENALS: Within normal limits.  KIDNEYS/URETERS: Within normal limits.    BLADDER: Within normal limits.  REPRODUCTIVE ORGANS: Uterus and adnexa within normal limits.    BOWEL: No bowel obstruction. Appendectomy, unremarkable surgical bed.  PERITONEUM: No ascites. No fluid collections. No pneumoperitoneum.  VESSELS: Within normal limits.  RETROPERITONEUM/LYMPH NODES: No lymphadenopathy.  ABDOMINAL WALL: Within normal limits.  BONES: Within normal limits.    IMPRESSION:  Colon loaded with abundant stool. Correlate with constipation history.

## 2024-02-02 NOTE — CONSULT NOTE ADULT - ASSESSMENT
24yo F w/ PMH ADHD and PSH laparosccopic appendectomy and partial cecectomy and washout (9/20/23 w/ Dr. James) who presenting w/ 4mo hx of early satiety and fecal urgency. Afebrile, nontachycardic, normotensive. On exam, abdomen is soft, mildly distended, nontender. Labs show WBC 7, chemistry unremarkable. CT AP with PO and IV contrast demonstrates large stool burden in colon.    No acute surgical intervention or admission indicated  Patient follows up regularly with gastroenterologist  Please discharge on bowel regimen including Miralax

## 2024-02-02 NOTE — ED PROVIDER NOTE - CLINICAL SUMMARY MEDICAL DECISION MAKING FREE TEXT BOX
CT without evidence of SBO or other post operative complication.  Consistent with constipation.  labs and urine reviewed.  NO leukocytosis, normal lytes and lactate.  Appears comfortable and well.  No abdominal distention or guarding.  Reassessed after imaging.  Reviewed findings and plan.  Discussed with Dr. James and surgical team.  PO miralax for symptom relief.  Conservative management discussed with the patient in detail.  Close follow up encouraged.  Strict ED return instructions discussed in detail and patient given the opportunity to ask any questions about their discharge diagnosis and instructions

## 2024-02-02 NOTE — ED PROVIDER NOTE - NSFOLLOWUPINSTRUCTIONS_ED_ALL_ED_FT
Take medication as prescribed and not longer than one week.      Constipation    Constipation is when a person has fewer than three bowel movements a week, has difficulty having a bowel movement, or has stools that are dry, hard, or larger than normal. Other symptoms can include abdominal pain or bloating. As people grow older, constipation is more common. A low-fiber diet, not taking in enough fluids, and taking certain medicines, including opioid painkillers, may make constipation worse. Treatment varies but may include dietary modifications (more fiber-rich foods), lifestyle modifications, and possible medications.     SEEK IMMEDIATE MEDICAL CARE IF YOU HAVE ANY OF THE FOLLOWING SYMPTOMS: bright red blood in your stool, constipation for longer than 4 days, abdominal or rectal pain, unexplained weight loss, or inability to pass gas.

## 2024-02-02 NOTE — ED PROVIDER NOTE - PATIENT PORTAL LINK FT
You can access the FollowMyHealth Patient Portal offered by [facility] by registering at the following website: [email or web address] By joining [email or web address] [email or web address] portal, you will also be able to view your health information using other applications (apps) compatible with our system.

## 2024-02-02 NOTE — ED ADULT NURSE NOTE - SKIN TEMPERATURE MOISTURE
Patient history of for thyroidectomy and postsurgical developed hyperparathyroidism with recurrent hypocalcemia being treated multiple emergency room although records reviewed continue supplementation with Rocaltrol 0 5 mg daily calcium carbonate 600 mg 2 pills 3 times a day magnesium oxide 2 times a day and follow-up with endocrinologist  Discussed as above seen by endocrinologist reviewed no change continue monitoring calcium phosphorus magnesium and PTH level intact warm/dry

## 2024-02-02 NOTE — ED PROVIDER NOTE - OBJECTIVE STATEMENT
Since this morning, constant, feels like "bloating and distention", without radiation, 6/10 with additional burning sensation.  + Nausea, no vomiting, fever or back pain.  Passing flatus, last BM this morning and normal in caliber.  In Sept 2023 had appendectomy and cecectomy for ruptured appendix.  Modified appetite since the surgery with baseline intermittent lower abdominal discomfort.  Denies other abdominal or pelvic surgery.  Takes Vivanz for ADD, did not take this morning.  Being evaluated by GI for SIBO.

## 2024-02-02 NOTE — ED ADULT NURSE NOTE - OBJECTIVE STATEMENT
28 y F pmHx appendectomy w/ partial cecectomy 9/2023, presents to ED c/o abdominal discomfort x3 hours PTA. 28 y F pmHx appendectomy w/ partial cecectomy 9/2023 s/p appendix rupture, presents to ED c/o abdominal discomfort x3 hours PTA. Pt reports limited appetite/PO intake s/p surgery, felt full upon eating last night, but woke up this AM still feeling "like I had thanksgiving dinner," a/w diffuse abdominal discomfort, bloating, nausea, frequent burping. Denies vomiting, diarrhea, urinary sx, CP/SOB, fever/chills, weakness, other acute sx at this time. Reports normal BM this AM. Abdomen soft and nontender to palpation. Pt also notes being evaluated for SIBO currently by outpt GI specialist but has not received results. Pt AAOx4, speaking in full and clear sentences, NAD at this time. Ambulatory with steady gait.

## (undated) DEVICE — GLV 7 PROTEXIS (WHITE)

## (undated) DEVICE — GLV 6.5 PROTEXIS (WHITE)

## (undated) DEVICE — DRSG MASTISOL

## (undated) DEVICE — SYR LUER LOK 30CC

## (undated) DEVICE — STAPLER COVIDIEN ENDO GIA STANDARD HANDLE

## (undated) DEVICE — Device

## (undated) DEVICE — SUT VICRYL 0 27" UR-6

## (undated) DEVICE — SUT MAXON 0 30" HGU-46

## (undated) DEVICE — TUBING STRYKER PNEUMOSURE HI FLOW INSUFFLATOR

## (undated) DEVICE — LIGASURE MARYLAND 37CM

## (undated) DEVICE — VENODYNE/SCD SLEEVE CALF MEDIUM

## (undated) DEVICE — SUT MONOCRYL 4-0 18" PS-2

## (undated) DEVICE — TROCAR COVIDIEN VERSAONE FIXATION CANNULA 5MM

## (undated) DEVICE — PACK GENERAL LAPAROSCOPY

## (undated) DEVICE — SOL IRR BAG NS 0.9% 3000ML

## (undated) DEVICE — SHEARS HARMONIC ACE 5MM X 36CM CURVED TIP

## (undated) DEVICE — TROCAR COVIDIEN VERSAONE BLADELESS FIXATION 5MM STANDARD

## (undated) DEVICE — TROCAR COVIDIEN VERSAONE BLUNT TIP HASSAN 12MM

## (undated) DEVICE — DRSG BANDAID 0.75X3"

## (undated) DEVICE — POSITIONER FOAM EGG CRATE ULNAR 2PCS (PINK)

## (undated) DEVICE — ENDOCATCH 10MM SPECIMEN POUCH

## (undated) DEVICE — WARMING BLANKET UPPER ADULT

## (undated) DEVICE — TIP METZENBAUM SCISSOR MONOPOLAR ENDOCUT (ORANGE)